# Patient Record
Sex: FEMALE | Race: WHITE | HISPANIC OR LATINO | Employment: UNEMPLOYED | URBAN - METROPOLITAN AREA
[De-identification: names, ages, dates, MRNs, and addresses within clinical notes are randomized per-mention and may not be internally consistent; named-entity substitution may affect disease eponyms.]

---

## 2017-12-14 ENCOUNTER — TELEPHONE (OUTPATIENT)
Dept: OBSTETRICS AND GYNECOLOGY | Facility: CLINIC | Age: 22
End: 2017-12-14

## 2017-12-14 DIAGNOSIS — N91.2 AMENORRHEA: Primary | ICD-10-CM

## 2018-01-02 ENCOUNTER — LAB VISIT (OUTPATIENT)
Dept: LAB | Facility: OTHER | Age: 23
End: 2018-01-02
Payer: MEDICAID

## 2018-01-02 ENCOUNTER — PROCEDURE VISIT (OUTPATIENT)
Dept: OBSTETRICS AND GYNECOLOGY | Facility: CLINIC | Age: 23
End: 2018-01-02
Payer: MEDICAID

## 2018-01-02 ENCOUNTER — INITIAL PRENATAL (OUTPATIENT)
Dept: OBSTETRICS AND GYNECOLOGY | Facility: CLINIC | Age: 23
End: 2018-01-02
Payer: MEDICAID

## 2018-01-02 ENCOUNTER — INITIAL CONSULT (OUTPATIENT)
Dept: MATERNAL FETAL MEDICINE | Facility: CLINIC | Age: 23
End: 2018-01-02
Payer: MEDICAID

## 2018-01-02 ENCOUNTER — TELEPHONE (OUTPATIENT)
Dept: OBSTETRICS AND GYNECOLOGY | Facility: CLINIC | Age: 23
End: 2018-01-02

## 2018-01-02 VITALS — DIASTOLIC BLOOD PRESSURE: 60 MMHG | BODY MASS INDEX: 18.1 KG/M2 | WEIGHT: 99 LBS | SYSTOLIC BLOOD PRESSURE: 100 MMHG

## 2018-01-02 VITALS — SYSTOLIC BLOOD PRESSURE: 98 MMHG | WEIGHT: 99.19 LBS | DIASTOLIC BLOOD PRESSURE: 62 MMHG | BODY MASS INDEX: 18.15 KG/M2

## 2018-01-02 DIAGNOSIS — Z34.00 SUPERVISION OF NORMAL FIRST PREGNANCY, ANTEPARTUM: Primary | ICD-10-CM

## 2018-01-02 DIAGNOSIS — O35.9XX0 SUSPECTED FETAL ABNORMALITY AFFECTING MANAGEMENT OF MOTHER, SINGLE OR UNSPECIFIED FETUS: ICD-10-CM

## 2018-01-02 DIAGNOSIS — N91.2 AMENORRHEA: ICD-10-CM

## 2018-01-02 DIAGNOSIS — Z36.89 ENCOUNTER FOR ULTRASOUND TO CHECK FETAL GROWTH: Primary | ICD-10-CM

## 2018-01-02 DIAGNOSIS — Z34.00 SUPERVISION OF NORMAL FIRST PREGNANCY, ANTEPARTUM: ICD-10-CM

## 2018-01-02 DIAGNOSIS — Z36.89 ESTABLISH GESTATIONAL AGE, ULTRASOUND: ICD-10-CM

## 2018-01-02 DIAGNOSIS — O35.9XX0 SUSPECTED FETAL ABNORMALITY AFFECTING MANAGEMENT OF MOTHER, ANTEPARTUM, SINGLE OR UNSPECIFIED FETUS: ICD-10-CM

## 2018-01-02 LAB
ABO + RH BLD: NORMAL
BASOPHILS # BLD AUTO: 0.01 K/UL
BASOPHILS NFR BLD: 0.1 %
BLD GP AB SCN CELLS X3 SERPL QL: NORMAL
C TRACH DNA SPEC QL NAA+PROBE: NOT DETECTED
DIFFERENTIAL METHOD: ABNORMAL
EOSINOPHIL # BLD AUTO: 0.1 K/UL
EOSINOPHIL NFR BLD: 0.5 %
ERYTHROCYTE [DISTWIDTH] IN BLOOD BY AUTOMATED COUNT: 12.6 %
HCG INTACT+B SERPL-ACNC: NORMAL MIU/ML
HCT VFR BLD AUTO: 38.5 %
HGB BLD-MCNC: 12.9 G/DL
LYMPHOCYTES # BLD AUTO: 2.5 K/UL
LYMPHOCYTES NFR BLD: 16.7 %
MCH RBC QN AUTO: 29.9 PG
MCHC RBC AUTO-ENTMCNC: 33.5 G/DL
MCV RBC AUTO: 89 FL
MONOCYTES # BLD AUTO: 0.7 K/UL
MONOCYTES NFR BLD: 4.6 %
N GONORRHOEA DNA SPEC QL NAA+PROBE: NOT DETECTED
NEUTROPHILS # BLD AUTO: 11.8 K/UL
NEUTROPHILS NFR BLD: 77.8 %
PLATELET # BLD AUTO: 362 K/UL
PMV BLD AUTO: 10 FL
RBC # BLD AUTO: 4.31 M/UL
RH BLD: NORMAL
WBC # BLD AUTO: 15.18 K/UL

## 2018-01-02 PROCEDURE — 36415 COLL VENOUS BLD VENIPUNCTURE: CPT

## 2018-01-02 PROCEDURE — 81220 CFTR GENE COM VARIANTS: CPT

## 2018-01-02 PROCEDURE — 99212 OFFICE O/P EST SF 10 MIN: CPT | Mod: PBBFAC,25,27 | Performed by: NURSE PRACTITIONER

## 2018-01-02 PROCEDURE — 99999 PR PBB SHADOW E&M-EST. PATIENT-LVL II: CPT | Mod: PBBFAC,,, | Performed by: NURSE PRACTITIONER

## 2018-01-02 PROCEDURE — 76817 TRANSVAGINAL US OBSTETRIC: CPT | Mod: 26,S$PBB,, | Performed by: OBSTETRICS & GYNECOLOGY

## 2018-01-02 PROCEDURE — 99212 OFFICE O/P EST SF 10 MIN: CPT | Mod: PBBFAC

## 2018-01-02 PROCEDURE — 86703 HIV-1/HIV-2 1 RESULT ANTBDY: CPT

## 2018-01-02 PROCEDURE — 87491 CHLMYD TRACH DNA AMP PROBE: CPT

## 2018-01-02 PROCEDURE — 86592 SYPHILIS TEST NON-TREP QUAL: CPT

## 2018-01-02 PROCEDURE — 88175 CYTOPATH C/V AUTO FLUID REDO: CPT | Performed by: PATHOLOGY

## 2018-01-02 PROCEDURE — 87340 HEPATITIS B SURFACE AG IA: CPT

## 2018-01-02 PROCEDURE — 86762 RUBELLA ANTIBODY: CPT

## 2018-01-02 PROCEDURE — 88141 CYTOPATH C/V INTERPRET: CPT | Mod: ,,, | Performed by: PATHOLOGY

## 2018-01-02 PROCEDURE — 86901 BLOOD TYPING SEROLOGIC RH(D): CPT

## 2018-01-02 PROCEDURE — 99203 OFFICE O/P NEW LOW 30 MIN: CPT | Mod: S$PBB,TH,25, | Performed by: OBSTETRICS & GYNECOLOGY

## 2018-01-02 PROCEDURE — 99999 PR PBB SHADOW E&M-EST. PATIENT-LVL II: CPT | Mod: PBBFAC,,,

## 2018-01-02 PROCEDURE — 84702 CHORIONIC GONADOTROPIN TEST: CPT

## 2018-01-02 PROCEDURE — 76817 TRANSVAGINAL US OBSTETRIC: CPT | Mod: PBBFAC | Performed by: OBSTETRICS & GYNECOLOGY

## 2018-01-02 PROCEDURE — 87086 URINE CULTURE/COLONY COUNT: CPT

## 2018-01-02 PROCEDURE — 85025 COMPLETE CBC W/AUTO DIFF WBC: CPT

## 2018-01-02 PROCEDURE — 86901 BLOOD TYPING SEROLOGIC RH(D): CPT | Mod: 91

## 2018-01-02 PROCEDURE — 99204 OFFICE O/P NEW MOD 45 MIN: CPT | Mod: TH,S$PBB,, | Performed by: NURSE PRACTITIONER

## 2018-01-02 RX ORDER — DOXYLAMINE SUCCINATE AND PYRIDOXINE HYDROCHLORIDE, DELAYED RELEASE TABLETS 10 MG/10 MG 10; 10 MG/1; MG/1
2 TABLET, DELAYED RELEASE ORAL NIGHTLY
Qty: 100 TABLET | Refills: 0 | Status: SHIPPED | OUTPATIENT
Start: 2018-01-02 | End: 2018-04-27

## 2018-01-02 NOTE — TELEPHONE ENCOUNTER
----- Message from Akin Lucero sent at 1/2/2018  4:20 PM CST -----  Contact: Pt  X_ 1st Request  _ 2nd Request  _ 3rd Request    Who: DAVE CONTRERAS [[id number]]    Why: Patient would like to know if she needs a follow up appointment.. If so would like to have the 23 in the afternoon    What Number to Call Back: [phone]    When to Expect a call back: (Before the end of the day)  -- if call after 3:00 call back will be tomorrow.

## 2018-01-02 NOTE — PROCEDURES
Procedures   Obstetrical ultrasound completed today.  See report in imaging section of [facility].

## 2018-01-02 NOTE — PROGRESS NOTES
Consultation for abnormal finding on early OB ultrasound  Physician requesting consultation: Dr. Monae  Time spent on consultation: 30 minutes (all of which was spent in face-to-face counseling and coordination of care)  Present for consultation: the patient and a friend accompanying her    We reviewed the finding a small early cystic hygroma and mild dorsal edema seen on today's ultrasound. The patient's OB, medical and family histories were reviewed and are unremarkable. She also denies any known history of congenital birth defects or genetic/chromosomal abnormalities in her fiance's family history.  The non-specific nature of today's ultrasound findings were reviewed. I stressed that although the findings may indicate a problem with the fetus, a normal outcome is also quite possible. The possible abnormalities associated with an early cystic hygroma/dorsal edema are fetal chromosomal/genetic disorders, fetal structural anomalies, or possible progression to hydrops and fetal demise. Testing options, both screening and diagnostic, for fetal chromosomal abnormalities were reviewed in detail. Pregnancy-loss rates of 1:500 for CVS and 1:1000 for amniocentesis were quoted. Plans for f/u ultrasound assessment to evaluate for progression to hydrops and to evaluate for fetal structural abnormalities were also reviewed. Fetal echo with Pediatric Cardiology at 22 - 24 weeks was also recommended.  After our discussion, the patient decided to proceed with cell-free DNA testing today and f/u ultrasound exam in 2 - 3 weeks. Further testing will be reviewed again at the patient's next appointment.

## 2018-01-02 NOTE — PROGRESS NOTES
Maryann Jones is a 22 y.o. female, ,  Presents today for a routine exam complaining of amenorrhea and positive home urine pregnancy test.  Patient's last menstrual period was 10/26/2017.   She is not currently on any contraception.  Reports nausea and vomiting daily. Reports breast tenderness. Denies vaginal bleeding and pelvic pain.  Prior  X 1- full term/ uncomplicated pregnancy.      ROS:  GENERAL: No weight changes. No swelling. No fatigue. No fever.  CARDIOVASCULAR: No chest pain. No shortness of breath. No leg cramps.   NEUROLOGICAL: No headaches. No vision changes.  BREASTS: No pain. No lumps. No discharge.  ABDOMEN: No pain. No diarrhea. No constipation.  REPRODUCTIVE: No abnormal bleeding.   VULVA: No pain. No lesions. No itching.  VAGINA: No relaxation. No itching. No odor. No discharge. No lesions.  URINARY: No incontinence. No nocturia. No frequency. No dysuria.    MEDICATIONS AND ALLERGIES:  Reviewed        COMPREHENSIVE GYN HISTORY:  PAP History: Denies abnormal Paps.  Infection History: Denies STDs. Denies PID.  Benign History: Denies uterine fibroids. Denies ovarian cysts. Denies endometriosis. Denies other conditions.  Cancer History: Denies cervical cancer. Denies uterine cancer or hyperplasia. Denies ovarian cancer. Denies vulvar cancer or pre-cancer. Denies vaginal cancer or pre-cancer. Denies breast cancer. Denies colon cancer.  Sexual Activity History: Reports currently being sexually active  Menstrual History: None.  Contraception: None    /60   Wt 44.9 kg (98 lb 15.8 oz)   LMP 10/26/2017   BMI 18.10 kg/m²     PE:  AFFECT: Calm, alert and oriented X 3. Interactive during exam  GENERAL: Appears well-nourished, well-developed, in no acute distress.  HEAD: Normocephalic, atruamatic  TEETH: Good dentition.  THYROID: No thyromegally   BREASTS: No masses, skin changes, nipple discharge or adenopathy bilaterally.  SKIN: Normal for race, warm, & dry. No lesions or rashes.  LUNGS:  Easy and unlabored, clear to auscultation bilaterally.  HEART: Regular rate and rhythm   ABDOMEN: Soft and nontender without masses or organomegally.  VULVA: No lesions, masses or tenderness.  VAGINA: Moist and well rugated without lesions or discharge.  CERVIX: Moist and pink without lesions, discharge or tenderness.      UTERUS SIZE: 8 week size, nontender and without masses.  ADNEXA: No masses or tenderness.  ESTIMATE OF PELVIC CAPACITY: Adequate  EXTREMITIES: No cyanosis, clubbing or edema. No calf tenderness.  LYMPH NODES: No axillary or inguinal adenopathy.    PROCEDURES:  UPT Positive  Genprobe  Pap      ASSESSMENT/PLAN:  Amenorrhea  Positive urine pregnancy test (JANA: 18, EGA: 9w5d based on LMP)    -  Routine prenatal care    Nausea and vomiting in pregnancy    -  Education regarding lifestyle and dietary modifications    -  Advised use of B6/Unisom. Pt will notify us if no relief/worsening symptoms, will consider Zofran if needed.      1st TRIMESTER COUNSELING: Discussed all, booklet provided:  Common complaints of pregnancy  HIV and other routine prenatal tests including  genetic screening  Risk factors identified by prenatal history  Oriented to practice - discussed anticipated course of prenatal care & indications for Ultrasound  Childbirth classes/Hospital facilities   Nutrition and weight gain counseling  Toxoplasmosis precautions (Cats/Raw Meat)  Sexual activity and exercise  Environmental/Work hazards  Travel  Tobacco (Ask, Advise, Assess, Assist, and Arrange), as well as alcohol and drug use  Use of any medications (Including supplements, Vitamins, Herbs, or OTC Drugs)  Domestic violence  Seat belt use      TERATOLOGY COUNSELING:   Discussed indications and options for aneuploidy screening - pamphlets given  Pt to see Dr. Rice to discuss genetic screening options  Dating US done today  FOLLOW-UP in 4 weeks with Dr. Letha Cabezas NP    OB/GYN

## 2018-01-02 NOTE — NURSING
Pt s/p consult with Dr. Cho and will have [id number] drawn today with OB labs. Pt informed that if % of fetal fraction to low, test may need to be repeated. Pt verbalized understanding of information.

## 2018-01-03 PROBLEM — Z67.91 RH NEGATIVE STATE IN ANTEPARTUM PERIOD: Status: ACTIVE | Noted: 2018-01-03

## 2018-01-03 PROBLEM — O26.899 RH NEGATIVE STATE IN ANTEPARTUM PERIOD: Status: ACTIVE | Noted: 2018-01-03

## 2018-01-03 LAB
BACTERIA UR CULT: NORMAL
HBV SURFACE AG SERPL QL IA: NEGATIVE
HIV 1+2 AB+HIV1 P24 AG SERPL QL IA: NEGATIVE
RPR SER QL: NORMAL
RUBV IGG SER-ACNC: 69.1 IU/ML
RUBV IGG SER-IMP: REACTIVE

## 2018-01-05 LAB — CFTR MUT ANL BLD/T: NORMAL

## 2018-01-08 PROBLEM — R87.612 LGSIL ON PAP SMEAR OF CERVIX: Status: ACTIVE | Noted: 2018-01-08

## 2018-01-10 ENCOUNTER — TELEPHONE (OUTPATIENT)
Dept: MATERNAL FETAL MEDICINE | Facility: CLINIC | Age: 23
End: 2018-01-10

## 2018-01-10 NOTE — TELEPHONE ENCOUNTER
"Patient returning call to [facility] clinic. Patient was notified that [id number] result is "Non-reportable" due to <3% fetal fraction. Patient states that she was "aware that this could happen" since it was drawn at early gestational age. Offered for patient to come in for repeat lab draw and patient states that she will have drawn when she returns for her [facility] ultrasound on 1/23/18.    Pt verbalized understanding of information.    "

## 2018-01-23 ENCOUNTER — APPOINTMENT (OUTPATIENT)
Dept: LAB | Facility: OTHER | Age: 23
End: 2018-01-23
Payer: MEDICAID

## 2018-01-23 ENCOUNTER — OFFICE VISIT (OUTPATIENT)
Dept: MATERNAL FETAL MEDICINE | Facility: CLINIC | Age: 23
End: 2018-01-23
Payer: MEDICAID

## 2018-01-23 VITALS — DIASTOLIC BLOOD PRESSURE: 62 MMHG | BODY MASS INDEX: 18.27 KG/M2 | WEIGHT: 99.88 LBS | SYSTOLIC BLOOD PRESSURE: 100 MMHG

## 2018-01-23 DIAGNOSIS — O35.9XX0 FETAL ABNORMALITY AFFECTING MANAGEMENT OF MOTHER, SINGLE OR UNSPECIFIED FETUS: ICD-10-CM

## 2018-01-23 DIAGNOSIS — Z36.89 ENCOUNTER FOR ULTRASOUND TO CHECK FETAL GROWTH: ICD-10-CM

## 2018-01-23 DIAGNOSIS — Z36.89 ENCOUNTER FOR FETAL ANATOMIC SURVEY: Primary | ICD-10-CM

## 2018-01-23 PROCEDURE — 99999 PR PBB SHADOW E&M-EST. PATIENT-LVL II: CPT | Mod: PBBFAC,,,

## 2018-01-23 PROCEDURE — 76801 OB US < 14 WKS SINGLE FETUS: CPT | Mod: 26,S$PBB,, | Performed by: OBSTETRICS & GYNECOLOGY

## 2018-01-23 PROCEDURE — 99212 OFFICE O/P EST SF 10 MIN: CPT | Mod: S$PBB,TH,25, | Performed by: OBSTETRICS & GYNECOLOGY

## 2018-01-23 PROCEDURE — 76801 OB US < 14 WKS SINGLE FETUS: CPT | Mod: PBBFAC | Performed by: OBSTETRICS & GYNECOLOGY

## 2018-01-23 PROCEDURE — 99212 OFFICE O/P EST SF 10 MIN: CPT | Mod: PBBFAC,TH

## 2018-01-23 RX ORDER — MULTIVITAMIN
1 TABLET ORAL DAILY
Status: ON HOLD | COMMUNITY
End: 2018-07-21 | Stop reason: HOSPADM

## 2018-01-23 NOTE — PROGRESS NOTES
OB Ultrasound Report (see PDF version under imaging tab) and f/u consultation    Indication  ========    F/U Consultation: fetal dorsal edema seen on last exam.    Maternal Assessment  ================    Weight 45 kg  Weight (lb) 99 lb    Method  ======    Transabdominal ultrasound examination.    Pregnancy  =========    [facility] pregnancy. Number of fetuses: 1.    Dating  ======    Cycle: regular cycle  Ultrasound examination on: 1/23/2018  GA by U/S based upon: CRL  GA by U/S 12 w + 5 d  JANA by U/S: 8/2/2018  Assigned: Dating performed on 01/2/2018, based on the LMP  Assigned GA 12 w + 5 d  Assigned JANA: 8/2/2018    General Evaluation  ===============    Cardiac activity: present.  Placenta: anterior.  Cord vessels: 3 vessel cord.  Amniotic fluid: normal amount.    Fetal Biometry  ===========    CRL 63.6 mm 12w 5d Hadlock   bpm    Fetal Anatomy  ===========    The following structures appear normal:  Cranium. Thorax. Abdominal wall.    The following structures were visualized:  GI tract. Urogenital tract. Arms. Legs.    Maternal Structures  ===============    Uterus / Cervix  Uterus: Normal  Ovaries / Tubes / Adnexa  Rt ovary: Visualized  Rt ovary D1 7.1 cm  Rt ovary D2 8.2 cm  Rt ovary D3 6.4 cm  Rt ovary mean 7.2 cm  Rt ovary vol 196.2 cm³  Rt ovarian cyst(s): No cysts identified  Origin of mass: right adnexal region  Findings: unilocular cyst  D1 71.0 mm  D2 59.0 mm  D3 61.0 mm  Vol 133.795 cm³  Lt ovary: Visualized  Lt ovarian corpus luteum: visualized  Lt ovary D1 2.8 cm  Lt ovary D2 2.0 cm  Lt ovary D3 1.8 cm  Lt ovary mean 2.2 cm  Lt ovary vol 5.2 cm³  Lt ovarian corpus luteum D1 20.0 mm  Lt ovarian corpus luteum D2 11.0 mm  Lt ovarian corpus luteum D3 14.0 mm  Pouch of Jaron / Other Structures  Cul de Sac: Visualized  Free fluid: No free fluid visualized    Ultrasound Impression and f/u consult  =========    Interval fetal growth has been normal. The previously seen dorsal edema has resolved,  and the nuchal translucency area is normal in  dimension. Normal fetal cardiac activity is observed.  Findings and plans for f/u discussed with the patient and her family today (10 minutes spent in discussion and coordination of f/u  care).  The simple cystic right adnexal mass seen on the last exam (probable paraovarian cyst) is stable in size.    Recommendation  ==============    1. Repeat blood draw today for cell-free DNA testing (fetal fraction insufficient on first sample)  2. Fetal anatomic survey ultrasound exam at 19 - 20 weeks  3. Fetal echo at 22 - 24 weeks.

## 2018-01-25 ENCOUNTER — TELEPHONE (OUTPATIENT)
Dept: PEDIATRIC CARDIOLOGY | Facility: CLINIC | Age: 23
End: 2018-01-25

## 2018-01-25 NOTE — TELEPHONE ENCOUNTER
----- Message from Christina Crowell RN sent at 1/23/2018  2:00 PM CST -----  Pramod Montanez-     Could you schedule this patient for a fetal echo (for cystic hygroma)? She is currently 12 weeks and 5 days and Dr. Cho would like it between 22 and 24 weeks (March 29 - April 12). She is having her [id number] redrawn today. She is a patient of Dr. Monae. She knows to expect your call.    Thank you!!  Christina

## 2018-02-01 ENCOUNTER — ROUTINE PRENATAL (OUTPATIENT)
Dept: OBSTETRICS AND GYNECOLOGY | Facility: CLINIC | Age: 23
End: 2018-02-01
Payer: MEDICAID

## 2018-02-01 VITALS — WEIGHT: 101 LBS | BODY MASS INDEX: 18.47 KG/M2 | DIASTOLIC BLOOD PRESSURE: 70 MMHG | SYSTOLIC BLOOD PRESSURE: 112 MMHG

## 2018-02-01 DIAGNOSIS — Z34.00 SUPERVISION OF NORMAL FIRST PREGNANCY, ANTEPARTUM: Primary | ICD-10-CM

## 2018-02-01 DIAGNOSIS — Z36.9 ANTENATAL SCREENING ENCOUNTER: ICD-10-CM

## 2018-02-01 PROCEDURE — 99213 OFFICE O/P EST LOW 20 MIN: CPT | Mod: TH,S$PBB,, | Performed by: OBSTETRICS & GYNECOLOGY

## 2018-02-01 PROCEDURE — 99999 PR PBB SHADOW E&M-EST. PATIENT-LVL II: CPT | Mod: PBBFAC,,, | Performed by: OBSTETRICS & GYNECOLOGY

## 2018-02-01 PROCEDURE — 99212 OFFICE O/P EST SF 10 MIN: CPT | Mod: PBBFAC,TH | Performed by: OBSTETRICS & GYNECOLOGY

## 2018-02-01 NOTE — PROGRESS NOTES
Doing well. No complaints. Followed by M for cystic hygroma that has now resolved. T21 still pending. Pt followed by M. RTC 4 weeks with Hailey.

## 2018-02-02 ENCOUNTER — TELEPHONE (OUTPATIENT)
Dept: MATERNAL FETAL MEDICINE | Facility: CLINIC | Age: 23
End: 2018-02-02

## 2018-02-02 NOTE — TELEPHONE ENCOUNTER
Negative [id number] results phoned to patient. Patient notified and aware that her lab specimen showed an expected representation of chromosome 21, 18 and 13 material and that it was consistent with a male fetus.     Patient verbalized understanding of reported results.

## 2018-03-02 ENCOUNTER — ROUTINE PRENATAL (OUTPATIENT)
Dept: OBSTETRICS AND GYNECOLOGY | Facility: CLINIC | Age: 23
End: 2018-03-02
Payer: MEDICAID

## 2018-03-02 VITALS
BODY MASS INDEX: 20.32 KG/M2 | SYSTOLIC BLOOD PRESSURE: 110 MMHG | WEIGHT: 111.13 LBS | DIASTOLIC BLOOD PRESSURE: 72 MMHG

## 2018-03-02 DIAGNOSIS — Z34.80 SUPERVISION OF OTHER NORMAL PREGNANCY, ANTEPARTUM: Primary | ICD-10-CM

## 2018-03-02 PROCEDURE — 99212 OFFICE O/P EST SF 10 MIN: CPT | Mod: TH,S$PBB,, | Performed by: NURSE PRACTITIONER

## 2018-03-02 PROCEDURE — 99213 OFFICE O/P EST LOW 20 MIN: CPT | Mod: PBBFAC,TH | Performed by: NURSE PRACTITIONER

## 2018-03-02 PROCEDURE — 99999 PR PBB SHADOW E&M-EST. PATIENT-LVL III: CPT | Mod: PBBFAC,,, | Performed by: NURSE PRACTITIONER

## 2018-03-02 NOTE — PROGRESS NOTES
Here for routine OB appt at 18w1d, with no complaints.  Pt with 10 lb weight gain since last appt.  Discussed portion control, making good choices ie. Protein snacks and exercise. Reports occasional quickening. Denies VB and cramping.  Pain, bleeding, and PTL precautions given.  MFM anatomy scan scheduled for 3/15/18.  Peds echo and cards scheduled for 4/2/18- cystic hygroma (resolved).  F/U scheduled 4 weeks

## 2018-03-15 ENCOUNTER — OFFICE VISIT (OUTPATIENT)
Dept: MATERNAL FETAL MEDICINE | Facility: CLINIC | Age: 23
End: 2018-03-15
Payer: MEDICAID

## 2018-03-15 VITALS — BODY MASS INDEX: 20.73 KG/M2 | WEIGHT: 113.31 LBS | DIASTOLIC BLOOD PRESSURE: 64 MMHG | SYSTOLIC BLOOD PRESSURE: 98 MMHG

## 2018-03-15 DIAGNOSIS — Z36.89 ENCOUNTER FOR ULTRASOUND TO CHECK FETAL GROWTH: Primary | ICD-10-CM

## 2018-03-15 DIAGNOSIS — O35.9XX0 FETAL ABNORMALITY AFFECTING MANAGEMENT OF MOTHER, SINGLE OR UNSPECIFIED FETUS: ICD-10-CM

## 2018-03-15 DIAGNOSIS — Z36.89 ENCOUNTER FOR FETAL ANATOMIC SURVEY: ICD-10-CM

## 2018-03-15 PROCEDURE — 76811 OB US DETAILED SNGL FETUS: CPT | Mod: PBBFAC | Performed by: OBSTETRICS & GYNECOLOGY

## 2018-03-15 PROCEDURE — 99212 OFFICE O/P EST SF 10 MIN: CPT | Mod: PBBFAC,TH,25

## 2018-03-15 PROCEDURE — 76811 OB US DETAILED SNGL FETUS: CPT | Mod: 26,S$PBB,, | Performed by: OBSTETRICS & GYNECOLOGY

## 2018-03-15 PROCEDURE — 99212 OFFICE O/P EST SF 10 MIN: CPT | Mod: S$PBB,TH,25, | Performed by: OBSTETRICS & GYNECOLOGY

## 2018-03-15 PROCEDURE — 99999 PR PBB SHADOW E&M-EST. PATIENT-LVL II: CPT | Mod: PBBFAC,,,

## 2018-03-15 NOTE — PROGRESS NOTES
OB Ultrasound Report (see PDF version under imaging tab) and return office visit    Indication  ========    Targeted Fetal anatomy survey - fetal dorsal edema and small cystic hygroma on first trimester exam.    History  ======    General History  Other: ZULEMA BRYAN,  Previous Outcomes  Preg. no. 1  Outcome: Live YOB: 2017  Gest. age 40 w + 0 d  Gender: male  Details:    2  Para 1  [facility] children born living (T) 1  [facility] children born (T) 1  Zamora living children (L) 1    Pregnancy History  ===============    Maternal Lab Tests  Test: Cell Free DNA Testing  Result: Materni [id number]  Wants to know gender: yes    Maternal Assessment  ================    Weight 51 kg  Weight (lb) 112 lb  BP syst 98 mmHg  BP diast 64 mmHg    Method  ======    Transabdominal ultrasound examination.    Pregnancy  =========    [facility] pregnancy. Number of fetuses: 1.    Dating  ======    Cycle: regular cycle  Ultrasound examination on: 3/15/2018  GA by U/S based upon: AC, BPD, Femur, HC  GA by U/S 20 w + 2 d  JANA by U/S: 2018  Assigned: Dating performed on 2018, based on the LMP  Assigned GA 20 w + 0 d  Assigned JANA: 2018    General Evaluation  ===============    Cardiac activity: present.  bpm.  Fetal movements: visualized.  Presentation: cephalic.  Placenta:  Placental site: anterior.  Umbilical cord: Cord vessels: 3 vessel cord. Cord insertion: placental insertion: normal.  Amniotic fluid: Amount of AF: normal amount.    Fetal Biometry  ===========    Fetal Biometry  BPD 48.3 mm 20w 4d Hadlock  OFD 60.4 mm 20w 6d Pedro  .1 mm 20w 0d Hadlock  .1 mm 20w 3d Hadlock  Femur 32.9 mm 20w 2d Hadlock  Cerebellum tr 19.4 mm 19w 3d Rojas  CM 3.7 mm  Nuchal fold 4.71 mm  Humerus 32.0 mm 20w 5d Pedro   g 27% Fer  Calculated by: Hadlock (BPD-HC-AC-FL)  EFW (lb) 0 lb  EFW (oz) 12 oz  Cephalic index 0.80  HC / AC 1.15  FL / BPD 0.68  FL / AC 0.22    bpm  Head / Face / Neck   5.2 mm  Nasal bone 6.2 mm    Fetal Anatomy  ===========    Cranium: normal  Lateral ventricles: normal  Choroid plexus: normal  Midline falx: normal  Cavum septi pellucidi: normal  Cerebellum: normal  Cisterna magna: normal  Lips: normal  Profile: normal  Nose: normal  RVOT: normal  LVOT: normal  4-chamber view: 4-chamber normal, septum normal  Situs: normal  Aortic arch: suboptimal  Ductal arch: suboptimal  SVC: normal  IVC: normal  3-vessel view: suboptimal  3-vessel-trachea view: suboptimal  Diaphragm: visualized  Cord insertion: normal  Stomach: normal  Kidneys: normal  Bladder: normal  Genitals: normal  Rt renal artery: visualized  Lt renal artery: visualized  Cervical spine: normal  Thoracic spine: normal  Lumbar spine: normal  Sacral spine: normal  Arms: both visualized  Legs: both visualized  Rt upper arm: normal  Rt forearm: normal  Rt hand: visualized  Lt upper arm: normal  Lt forearm: normal  Lt hand: visualized  Rt upper leg: normal  Rt lower leg: normal  Rt foot: visualized  Lt upper leg: normal  Lt lower leg: normal  Lt foot: visualized  Position of feet: normal  Gender: male  Wants to know gender: yes    Maternal Structures  ===============    Uterus / Cervix  Cervical length 32.0 mm  Ovaries / Tubes / Adnexa  Origin of mass: paraovarian  Findings: simple, unilocular cyst  D1 77.0 mm  D2 51.0 mm  D3 59.0 mm  Vol 121.314 cm³  Doppler:  no detectable blood flow    Lt ovary: Visualized    Ultrasound Impression and office visit    A detailed fetal anatomic ultrasound examination was performed for the following high risk indication: fetal dorsal edema and small  cystic hygroma on first trimester exam. No fetal structural malformations are identified; however, fetal cardiac imaging is incomplete  today. The patient is already scheduled for a fetal echo with Pediatric Cardiology due to the increased risk for cardiac anomalies  associated with cystic hygromas and fetal  edema.  Fetal size today is consistent with established gestational age. Cervical length is normal. Placental location is normal without evidence  of previa. The right paraovarian cyst seen on prior exams is stable.  The findings from today's exam and plans for f/u assessment, including fetal echo with Pediatric Cardiology and f/u growth studies  beginning at 28 weeks were discussed with the patient today (10 minutes spent in face-to-face discussion).

## 2018-04-17 ENCOUNTER — TELEPHONE (OUTPATIENT)
Dept: OBSTETRICS AND GYNECOLOGY | Facility: CLINIC | Age: 23
End: 2018-04-17

## 2018-04-17 DIAGNOSIS — Z34.00 SUPERVISION OF NORMAL FIRST PREGNANCY, ANTEPARTUM: Primary | ICD-10-CM

## 2018-04-17 NOTE — TELEPHONE ENCOUNTER
Returning patients call. Patient notified of scheduled lab appointment on Thursday at 1:30. Verbalized understanding.               ----- Message from Irma French sent at 4/17/2018  3:12 PM CDT -----  Contact: Breanne  Name of Who is Calling:Breanne      What is the request in detail: Patient was returning a missed call back from the staff       Can the clinic reply by [facility]: No      What Number to Call Back if not in [facility]:[phone]

## 2018-04-17 NOTE — TELEPHONE ENCOUNTER
Called to inform patient of lab appointment that has been scheduled for Thursday, at 1:30, prior to her appointment with Hailey. No answer ,left message to call office.          Please call pt and let her know we will have her do her OB glucose screen with next appt.  She will need to go to the lab prior to the appt and drink the glucose drink, then come to appt and go back to lab 1 hr later for blood work.  Not a fasting lab- ok to eat but avoid food high in sugar.  Orders are in please schedule

## 2018-04-26 ENCOUNTER — TELEPHONE (OUTPATIENT)
Dept: OBSTETRICS AND GYNECOLOGY | Facility: CLINIC | Age: 23
End: 2018-04-26

## 2018-04-26 NOTE — TELEPHONE ENCOUNTER
Patient notified, stated that she will go to lab for 9:30.           Pt is scheduled with me for appt tomorrow.  She needs OB glucose screen.  Please have her for to the lab prior to her appt to drink glucose drink.  Not a fasting lab- ok to eat avoid foods high in sugar.  Order was previously placed

## 2018-04-27 ENCOUNTER — LAB VISIT (OUTPATIENT)
Dept: LAB | Facility: OTHER | Age: 23
End: 2018-04-27
Attending: NURSE PRACTITIONER
Payer: MEDICAID

## 2018-04-27 ENCOUNTER — ROUTINE PRENATAL (OUTPATIENT)
Dept: OBSTETRICS AND GYNECOLOGY | Facility: CLINIC | Age: 23
End: 2018-04-27
Payer: MEDICAID

## 2018-04-27 VITALS — WEIGHT: 125 LBS | SYSTOLIC BLOOD PRESSURE: 102 MMHG | BODY MASS INDEX: 22.86 KG/M2 | DIASTOLIC BLOOD PRESSURE: 58 MMHG

## 2018-04-27 DIAGNOSIS — Z23 NEED FOR TDAP VACCINATION: ICD-10-CM

## 2018-04-27 DIAGNOSIS — Z34.00 SUPERVISION OF NORMAL FIRST PREGNANCY, ANTEPARTUM: Primary | ICD-10-CM

## 2018-04-27 DIAGNOSIS — Z67.91 RH NEGATIVE STATE IN ANTEPARTUM PERIOD: ICD-10-CM

## 2018-04-27 DIAGNOSIS — Z34.00 SUPERVISION OF NORMAL FIRST PREGNANCY, ANTEPARTUM: ICD-10-CM

## 2018-04-27 DIAGNOSIS — O26.899 RH NEGATIVE STATE IN ANTEPARTUM PERIOD: ICD-10-CM

## 2018-04-27 LAB
BASOPHILS # BLD AUTO: ABNORMAL K/UL
BASOPHILS NFR BLD: 0 %
DIFFERENTIAL METHOD: ABNORMAL
EOSINOPHIL # BLD AUTO: ABNORMAL K/UL
EOSINOPHIL NFR BLD: 1 %
ERYTHROCYTE [DISTWIDTH] IN BLOOD BY AUTOMATED COUNT: 12.9 %
GLUCOSE SERPL-MCNC: 108 MG/DL
HCT VFR BLD AUTO: 33.3 %
HGB BLD-MCNC: 10.4 G/DL
LYMPHOCYTES # BLD AUTO: ABNORMAL K/UL
LYMPHOCYTES NFR BLD: 12 %
MCH RBC QN AUTO: 28.4 PG
MCHC RBC AUTO-ENTMCNC: 31.2 G/DL
MCV RBC AUTO: 91 FL
MONOCYTES # BLD AUTO: ABNORMAL K/UL
MONOCYTES NFR BLD: 7 %
MYELOCYTES NFR BLD MANUAL: 1 %
NEUTROPHILS NFR BLD: 78 %
NEUTS BAND NFR BLD MANUAL: 1 %
PLATELET # BLD AUTO: 300 K/UL
PLATELET BLD QL SMEAR: ABNORMAL
PMV BLD AUTO: 9.8 FL
RBC # BLD AUTO: 3.66 M/UL
WBC # BLD AUTO: 13.65 K/UL

## 2018-04-27 PROCEDURE — 85007 BL SMEAR W/DIFF WBC COUNT: CPT

## 2018-04-27 PROCEDURE — 36415 COLL VENOUS BLD VENIPUNCTURE: CPT

## 2018-04-27 PROCEDURE — 82950 GLUCOSE TEST: CPT

## 2018-04-27 PROCEDURE — 85027 COMPLETE CBC AUTOMATED: CPT

## 2018-04-27 PROCEDURE — 99212 OFFICE O/P EST SF 10 MIN: CPT | Mod: TH,S$PBB,, | Performed by: NURSE PRACTITIONER

## 2018-04-27 PROCEDURE — 99213 OFFICE O/P EST LOW 20 MIN: CPT | Mod: PBBFAC,TH | Performed by: NURSE PRACTITIONER

## 2018-04-27 PROCEDURE — 99999 PR PBB SHADOW E&M-EST. PATIENT-LVL III: CPT | Mod: PBBFAC,,, | Performed by: NURSE PRACTITIONER

## 2018-04-27 NOTE — PROGRESS NOTES
Here for routine OB appt at 26w1d, with complaints of mid back pain.  Discussed Tylenol PRN, alternating ice and heat.  Reports + FM.  Denies VB and cramping.  Pain, bleeding, and PTL precautions given.  Discussed weight gain in pregnancy - she has gained 26 lbs to date.  Discussed portion control, good food choices, and exercise.  OB glucose and CBC today.    Rhogam and Tdap with next appt.   MFM scan on 5/10/18 for growth.   F/U scheduled 3 weeks

## 2018-05-04 ENCOUNTER — PATIENT MESSAGE (OUTPATIENT)
Dept: MATERNAL FETAL MEDICINE | Facility: CLINIC | Age: 23
End: 2018-05-04

## 2018-05-15 ENCOUNTER — CLINICAL SUPPORT (OUTPATIENT)
Dept: OBSTETRICS AND GYNECOLOGY | Facility: CLINIC | Age: 23
End: 2018-05-15
Payer: MEDICAID

## 2018-05-15 ENCOUNTER — ROUTINE PRENATAL (OUTPATIENT)
Dept: OBSTETRICS AND GYNECOLOGY | Facility: CLINIC | Age: 23
End: 2018-05-15
Payer: MEDICAID

## 2018-05-15 VITALS
SYSTOLIC BLOOD PRESSURE: 102 MMHG | WEIGHT: 127.44 LBS | BODY MASS INDEX: 23.31 KG/M2 | DIASTOLIC BLOOD PRESSURE: 64 MMHG

## 2018-05-15 DIAGNOSIS — O36.5930 POOR FETAL GROWTH AFFECTING MANAGEMENT OF MOTHER IN THIRD TRIMESTER, SINGLE OR UNSPECIFIED FETUS: Primary | ICD-10-CM

## 2018-05-15 DIAGNOSIS — Z67.91 RH NEGATIVE STATE IN ANTEPARTUM PERIOD: ICD-10-CM

## 2018-05-15 DIAGNOSIS — O26.899 RH NEGATIVE STATE IN ANTEPARTUM PERIOD: ICD-10-CM

## 2018-05-15 PROCEDURE — 99999 PR PBB SHADOW E&M-EST. PATIENT-LVL I: CPT | Mod: PBBFAC,,,

## 2018-05-15 PROCEDURE — 99999 PR PBB SHADOW E&M-EST. PATIENT-LVL III: CPT | Mod: PBBFAC,,, | Performed by: OBSTETRICS & GYNECOLOGY

## 2018-05-15 PROCEDURE — 96372 THER/PROPH/DIAG INJ SC/IM: CPT | Mod: PBBFAC

## 2018-05-15 PROCEDURE — 99213 OFFICE O/P EST LOW 20 MIN: CPT | Mod: PBBFAC,27,TH,25 | Performed by: OBSTETRICS & GYNECOLOGY

## 2018-05-15 PROCEDURE — 99214 OFFICE O/P EST MOD 30 MIN: CPT | Mod: TH,S$PBB,, | Performed by: OBSTETRICS & GYNECOLOGY

## 2018-05-15 PROCEDURE — 99211 OFF/OP EST MAY X REQ PHY/QHP: CPT | Mod: PBBFAC,TH

## 2018-05-15 PROCEDURE — 90471 IMMUNIZATION ADMIN: CPT | Mod: PBBFAC

## 2018-05-15 NOTE — PROGRESS NOTES
Pt doing well. No vaginal bleeding, no loss of fluid, positive fetal movement, no contractions. Bleeding/labor/rom/fmc precautions.     Rhogam, Tdap done today.     Pt missed [facility] scan as she does not want to keep getting growth scans. We discussed importance of scans. She agreed she will keep fetal echo and will do 1 more growth scan at 32 weeks. I called [facility] and scheduled this for 6/5/18. RTC 3 weeks with me.

## 2018-05-15 NOTE — PROGRESS NOTES
Here for TDAP injection. Patient without complaint of pain at this time ,Injection given. Tolerated well. No pain noted after injection.  Advised to wait in lobby 15 minutes and report any adverse reactions.         Site: [facility]    Baby Friendly Handout Given to Patient      Here for rhogam injection , no complaints at this time. Verified patient is RH negative.   No complaint of pain before or after injection. Patient advised to wait 15 minutes before leaving and report any adverse reactions.      Site: [facility]

## 2018-05-25 ENCOUNTER — CLINICAL SUPPORT (OUTPATIENT)
Dept: PEDIATRIC CARDIOLOGY | Facility: CLINIC | Age: 23
End: 2018-05-25
Attending: OBSTETRICS & GYNECOLOGY
Payer: MEDICAID

## 2018-05-25 ENCOUNTER — OFFICE VISIT (OUTPATIENT)
Dept: PEDIATRIC CARDIOLOGY | Facility: CLINIC | Age: 23
End: 2018-05-25
Attending: PEDIATRICS
Payer: MEDICAID

## 2018-05-25 VITALS
WEIGHT: 131.19 LBS | SYSTOLIC BLOOD PRESSURE: 102 MMHG | HEIGHT: 62 IN | DIASTOLIC BLOOD PRESSURE: 64 MMHG | BODY MASS INDEX: 24.14 KG/M2

## 2018-05-25 DIAGNOSIS — O35.9XX0 FETAL ABNORMALITY AFFECTING MANAGEMENT OF MOTHER, SINGLE OR UNSPECIFIED FETUS: ICD-10-CM

## 2018-05-25 PROCEDURE — 99212 OFFICE O/P EST SF 10 MIN: CPT | Mod: PBBFAC | Performed by: PEDIATRICS

## 2018-05-25 PROCEDURE — 76825 ECHO EXAM OF FETAL HEART: CPT | Mod: 26,S$PBB,, | Performed by: PEDIATRICS

## 2018-05-25 PROCEDURE — 76827 ECHO EXAM OF FETAL HEART: CPT | Mod: 26,S$PBB,, | Performed by: PEDIATRICS

## 2018-05-25 PROCEDURE — 93325 DOPPLER ECHO COLOR FLOW MAPG: CPT | Mod: 26,S$PBB,, | Performed by: PEDIATRICS

## 2018-05-25 PROCEDURE — 76825 ECHO EXAM OF FETAL HEART: CPT | Mod: PBBFAC | Performed by: PEDIATRICS

## 2018-05-25 PROCEDURE — 76827 ECHO EXAM OF FETAL HEART: CPT | Mod: PBBFAC | Performed by: PEDIATRICS

## 2018-05-25 PROCEDURE — 99999 PR PBB SHADOW E&M-EST. PATIENT-LVL II: CPT | Mod: PBBFAC,,, | Performed by: PEDIATRICS

## 2018-05-25 PROCEDURE — 93325 DOPPLER ECHO COLOR FLOW MAPG: CPT | Mod: PBBFAC | Performed by: PEDIATRICS

## 2018-05-25 PROCEDURE — 99203 OFFICE O/P NEW LOW 30 MIN: CPT | Mod: 25,S$PBB,, | Performed by: PEDIATRICS

## 2018-05-25 NOTE — LETTER
May 28, 2018        Jeni Monae MD  [address] [address]             [facility]  [address]  [facility] [address]  Phone: [phone]  Fax: [phone]   Patient: Maryann Jones   MR Number: [id number]   YOB: 1995   Date of Visit: 5/25/2018       Dear Dr. Monae:    Thank you for referring Maryann Jones to me for evaluation. Below are the relevant portions of my assessment and plan of care.            If you have questions, please do not hesitate to call me. I look forward to following Maryann along with you.    Sincerely,      Hafsa Lewis MD           CC  No Recipients

## 2018-05-26 NOTE — PROGRESS NOTES
[facility], operated by [facility] Fetal Cardiology [facility]    Today, I had the pleasure of evaluating Maryann Jones who is now 23 y.o. and carrying her second pregnancy at 30 1/4 weeks gestation with an JANA of 2018. She was referred for evaluation of the fetal heart due fetal cystic hygroma.  Anatomy scan without concern of anomalies. [id number] negative.    She is carrying a male fetus. The pregnancy has otherwise been uncomplicated.     Obstetric History:    .  Her OB history is otherwise unremarkable.     History reviewed. No pertinent past medical history.    Current Outpatient Prescriptions:     multivitamin (ONE DAILY MULTIVITAMIN) per tablet, Take 1 tablet by mouth once daily., Disp: , Rfl:     ranitidine (ZANTAC) 150 MG capsule, Take 150 mg by mouth as needed for Heartburn., Disp: , Rfl:      Review of patient's allergies indicates:  No Known Allergies    Family History: Negative for congenital heart disease, genetic syndromes, multiple miscarriages or other congenital anomalies.    Social History: Ms. Maryann Jones is in a relationship with he father of the baby/single.  The father of the baby is involved.     FETAL ECHOCARDIOGRAM (summary):  Fetal echo at 30 1/7 wga, JANA 18.  Normal fetal echocardiogram.  (Full report in electronic medical record)    Impression:  Single active male fetus at 30 1/7 wga.  Normal fetal echocardiogram.      Todays fetal echocardiogram is normal, within the limitations of fetal echocardiography.  I discussed with her that fetal echocardiography is insufficiently sensitive to rule out all septal defects, anomalies of pulmonary and systemic veins, arch anomalies, and some valvar abnormalities, nor can it ensure that the ductus arteriosus and foramen ovale will spontaneously close.     Recommendations:  Location, timing, and mode of delivery will be determined by the obstetrical team.  She does not require further follow-up in the fetal echocardiography clinic, but I  would be happy to see her again if additional questions or concerns arise.    Should there be any concerns about the baby's heart after birth, a post- echocardiogram and cardiology consultation are recommended.         Hafsa Lewis MD, MSCI  Pediatric Cardiology  Pediatric Echocardiography, Fetal Echocardiography, Cardiac MRI  [facility] [address] [place]  [address]  Phone [phone], Fax [phone]      The above information was discussed in detail including the use of diagrams, 30 minutes were used for the evaluation with half of that time in discussion and counseling.

## 2018-06-11 ENCOUNTER — OFFICE VISIT (OUTPATIENT)
Dept: MATERNAL FETAL MEDICINE | Facility: CLINIC | Age: 23
End: 2018-06-11
Payer: MEDICAID

## 2018-06-11 DIAGNOSIS — O36.5930 POOR FETAL GROWTH AFFECTING MANAGEMENT OF MOTHER IN THIRD TRIMESTER, SINGLE OR UNSPECIFIED FETUS: ICD-10-CM

## 2018-06-11 DIAGNOSIS — O40.3XX0 POLYHYDRAMNIOS IN THIRD TRIMESTER COMPLICATION, SINGLE OR UNSPECIFIED FETUS: ICD-10-CM

## 2018-06-11 PROCEDURE — 76816 OB US FOLLOW-UP PER FETUS: CPT | Mod: 26,S$PBB,, | Performed by: OBSTETRICS & GYNECOLOGY

## 2018-06-11 PROCEDURE — 76819 FETAL BIOPHYS PROFIL W/O NST: CPT | Mod: 26,S$PBB,, | Performed by: OBSTETRICS & GYNECOLOGY

## 2018-06-11 PROCEDURE — 99499 UNLISTED E&M SERVICE: CPT | Mod: S$PBB,,, | Performed by: OBSTETRICS & GYNECOLOGY

## 2018-06-11 PROCEDURE — 76816 OB US FOLLOW-UP PER FETUS: CPT | Mod: PBBFAC | Performed by: OBSTETRICS & GYNECOLOGY

## 2018-06-11 PROCEDURE — 76819 FETAL BIOPHYS PROFIL W/O NST: CPT | Mod: PBBFAC | Performed by: OBSTETRICS & GYNECOLOGY

## 2018-06-11 NOTE — LETTER
June 11, 2018      Jeni Monae MD  [address] [address]           [facility]etal [facility]  [address]  [facility] [address]  Phone: [phone]          Patient: Maryann Jones   MR Number: [id number]   YOB: 1995   Date of Visit: 6/11/2018       Dear Dr. Jeni Monae:    Thank you for referring Maryann Jones to me for evaluation. Attached you will find relevant portions of my assessment and plan of care.    If you have questions, please do not hesitate to call me. I look forward to following Maryann Jones along with you.    Sincerely,    Jeni Jay MD    Enclosure  CC:  No Recipients    If you would like to receive this communication electronically, please contact [email or web address] or [phone] to request more information on [employer] Link access.    For providers and/or their staff who would like to refer a patient to [facility], please contact us through our one-stop-shop provider referral line, [facility], at [phone].    If you feel you have received this communication in error or would no longer like to receive these types of communications, please e-mail [email or web address]

## 2018-06-15 DIAGNOSIS — O40.3XX0 POLYHYDRAMNIOS AFFECTING PREGNANCY IN THIRD TRIMESTER: Primary | ICD-10-CM

## 2018-06-19 ENCOUNTER — ROUTINE PRENATAL (OUTPATIENT)
Dept: OBSTETRICS AND GYNECOLOGY | Facility: CLINIC | Age: 23
End: 2018-06-19
Payer: MEDICAID

## 2018-06-19 ENCOUNTER — HOSPITAL ENCOUNTER (OUTPATIENT)
Dept: PERINATAL CARE | Facility: OTHER | Age: 23
Discharge: HOME | End: 2018-06-19
Attending: OBSTETRICS & GYNECOLOGY
Payer: MEDICAID

## 2018-06-19 VITALS
SYSTOLIC BLOOD PRESSURE: 104 MMHG | BODY MASS INDEX: 24.64 KG/M2 | DIASTOLIC BLOOD PRESSURE: 58 MMHG | WEIGHT: 134.69 LBS

## 2018-06-19 DIAGNOSIS — O40.3XX0 POLYHYDRAMNIOS AFFECTING PREGNANCY IN THIRD TRIMESTER: ICD-10-CM

## 2018-06-19 DIAGNOSIS — Z34.80 SUPERVISION OF OTHER NORMAL PREGNANCY, ANTEPARTUM: Primary | ICD-10-CM

## 2018-06-19 PROCEDURE — 99999 PR PBB SHADOW E&M-EST. PATIENT-LVL II: CPT | Mod: PBBFAC,,, | Performed by: OBSTETRICS & GYNECOLOGY

## 2018-06-19 PROCEDURE — 76819 FETAL BIOPHYS PROFIL W/O NST: CPT | Mod: 26,,, | Performed by: OBSTETRICS & GYNECOLOGY

## 2018-06-19 PROCEDURE — 99213 OFFICE O/P EST LOW 20 MIN: CPT | Mod: TH,S$PBB,, | Performed by: OBSTETRICS & GYNECOLOGY

## 2018-06-19 PROCEDURE — 76819 FETAL BIOPHYS PROFIL W/O NST: CPT

## 2018-06-19 PROCEDURE — 99212 OFFICE O/P EST SF 10 MIN: CPT | Mod: PBBFAC,TH | Performed by: OBSTETRICS & GYNECOLOGY

## 2018-06-19 NOTE — PROGRESS NOTES
Pt doing well. No vaginal bleeding, no loss of fluid, positive fetal movement, no contractions. Bleeding/labor/rom/fmc precautions.     PNT today for Poly. RTC 2 weeks. Induction at 39 weeks if undelivered.

## 2018-06-25 ENCOUNTER — OFFICE VISIT (OUTPATIENT)
Dept: MATERNAL FETAL MEDICINE | Facility: CLINIC | Age: 23
End: 2018-06-25
Payer: MEDICAID

## 2018-06-25 DIAGNOSIS — O40.3XX0 POLYHYDRAMNIOS AFFECTING PREGNANCY IN THIRD TRIMESTER: ICD-10-CM

## 2018-06-25 PROCEDURE — 99499 UNLISTED E&M SERVICE: CPT | Mod: S$PBB,,, | Performed by: OBSTETRICS & GYNECOLOGY

## 2018-06-25 PROCEDURE — 76819 FETAL BIOPHYS PROFIL W/O NST: CPT | Mod: 26,S$PBB,, | Performed by: OBSTETRICS & GYNECOLOGY

## 2018-06-25 PROCEDURE — 76819 FETAL BIOPHYS PROFIL W/O NST: CPT | Mod: PBBFAC | Performed by: OBSTETRICS & GYNECOLOGY

## 2018-06-25 NOTE — PROGRESS NOTES
Obstetrical ultrasound completed today.  See report in imaging section of [facility].

## 2018-07-02 ENCOUNTER — PATIENT MESSAGE (OUTPATIENT)
Dept: OBSTETRICS AND GYNECOLOGY | Facility: CLINIC | Age: 23
End: 2018-07-02

## 2018-07-02 ENCOUNTER — OFFICE VISIT (OUTPATIENT)
Dept: MATERNAL FETAL MEDICINE | Facility: CLINIC | Age: 23
End: 2018-07-02
Payer: MEDICAID

## 2018-07-02 ENCOUNTER — ROUTINE PRENATAL (OUTPATIENT)
Dept: OBSTETRICS AND GYNECOLOGY | Facility: CLINIC | Age: 23
End: 2018-07-02
Payer: MEDICAID

## 2018-07-02 ENCOUNTER — LAB VISIT (OUTPATIENT)
Dept: LAB | Facility: OTHER | Age: 23
End: 2018-07-02
Attending: OBSTETRICS & GYNECOLOGY
Payer: MEDICAID

## 2018-07-02 VITALS — SYSTOLIC BLOOD PRESSURE: 98 MMHG | BODY MASS INDEX: 25.16 KG/M2 | WEIGHT: 137.56 LBS | DIASTOLIC BLOOD PRESSURE: 62 MMHG

## 2018-07-02 DIAGNOSIS — Z34.80 SUPERVISION OF OTHER NORMAL PREGNANCY, ANTEPARTUM: Primary | ICD-10-CM

## 2018-07-02 DIAGNOSIS — Z34.80 SUPERVISION OF OTHER NORMAL PREGNANCY, ANTEPARTUM: ICD-10-CM

## 2018-07-02 DIAGNOSIS — O40.3XX0 POLYHYDRAMNIOS AFFECTING PREGNANCY IN THIRD TRIMESTER: ICD-10-CM

## 2018-07-02 LAB
BASOPHILS # BLD AUTO: 0.02 K/UL
BASOPHILS NFR BLD: 0.1 %
DIFFERENTIAL METHOD: ABNORMAL
EOSINOPHIL # BLD AUTO: 0.1 K/UL
EOSINOPHIL NFR BLD: 0.7 %
ERYTHROCYTE [DISTWIDTH] IN BLOOD BY AUTOMATED COUNT: 14.5 %
HCT VFR BLD AUTO: 32.1 %
HGB BLD-MCNC: 9.8 G/DL
LYMPHOCYTES # BLD AUTO: 2.1 K/UL
LYMPHOCYTES NFR BLD: 15.3 %
MCH RBC QN AUTO: 24.3 PG
MCHC RBC AUTO-ENTMCNC: 30.5 G/DL
MCV RBC AUTO: 80 FL
MONOCYTES # BLD AUTO: 1.1 K/UL
MONOCYTES NFR BLD: 7.9 %
NEUTROPHILS # BLD AUTO: 10.2 K/UL
NEUTROPHILS NFR BLD: 74.1 %
PLATELET # BLD AUTO: 371 K/UL
PMV BLD AUTO: 9.7 FL
RBC # BLD AUTO: 4.03 M/UL
WBC # BLD AUTO: 13.75 K/UL

## 2018-07-02 PROCEDURE — 76819 FETAL BIOPHYS PROFIL W/O NST: CPT | Mod: 26,S$PBB,, | Performed by: OBSTETRICS & GYNECOLOGY

## 2018-07-02 PROCEDURE — 99213 OFFICE O/P EST LOW 20 MIN: CPT | Mod: PBBFAC,TH | Performed by: OBSTETRICS & GYNECOLOGY

## 2018-07-02 PROCEDURE — 99999 PR PBB SHADOW E&M-EST. PATIENT-LVL III: CPT | Mod: PBBFAC,,, | Performed by: OBSTETRICS & GYNECOLOGY

## 2018-07-02 PROCEDURE — 86592 SYPHILIS TEST NON-TREP QUAL: CPT

## 2018-07-02 PROCEDURE — 36415 COLL VENOUS BLD VENIPUNCTURE: CPT

## 2018-07-02 PROCEDURE — 85025 COMPLETE CBC W/AUTO DIFF WBC: CPT

## 2018-07-02 PROCEDURE — 99213 OFFICE O/P EST LOW 20 MIN: CPT | Mod: TH,S$PBB,, | Performed by: OBSTETRICS & GYNECOLOGY

## 2018-07-02 PROCEDURE — 76819 FETAL BIOPHYS PROFIL W/O NST: CPT | Mod: PBBFAC | Performed by: OBSTETRICS & GYNECOLOGY

## 2018-07-02 PROCEDURE — 99499 UNLISTED E&M SERVICE: CPT | Mod: S$PBB,,, | Performed by: OBSTETRICS & GYNECOLOGY

## 2018-07-02 PROCEDURE — 86703 HIV-1/HIV-2 1 RESULT ANTBDY: CPT

## 2018-07-02 PROCEDURE — 87081 CULTURE SCREEN ONLY: CPT

## 2018-07-02 RX ORDER — FERROUS SULFATE 325(65) MG
325 TABLET ORAL DAILY
Qty: 30 TABLET | Refills: 3 | Status: SHIPPED | OUTPATIENT
Start: 2018-07-02 | End: 2019-07-02

## 2018-07-02 NOTE — PROGRESS NOTES
Obstetrical ultrasound completed today.  See report in imaging section of [facility].

## 2018-07-02 NOTE — PROGRESS NOTES
Pt doing well. No vaginal bleeding, no loss of fluid, positive fetal movement, no contractions. Bleeding/labor/rom/fmc precautions.     Induction scheduled for 7/26/18 @ 4:30 am    Growth u/s today showed polyhydramnios is stable. GBBS done. Labs today. RTC 2 weeks with SPADD appt.

## 2018-07-03 LAB
HIV 1+2 AB+HIV1 P24 AG SERPL QL IA: NEGATIVE
RPR SER QL: NORMAL

## 2018-07-05 LAB — BACTERIA SPEC AEROBE CULT: NORMAL

## 2018-07-17 ENCOUNTER — OFFICE VISIT (OUTPATIENT)
Dept: MATERNAL FETAL MEDICINE | Facility: CLINIC | Age: 23
End: 2018-07-17
Payer: MEDICAID

## 2018-07-17 ENCOUNTER — ROUTINE PRENATAL (OUTPATIENT)
Dept: OBSTETRICS AND GYNECOLOGY | Facility: CLINIC | Age: 23
End: 2018-07-17
Payer: MEDICAID

## 2018-07-17 VITALS
BODY MASS INDEX: 26.17 KG/M2 | SYSTOLIC BLOOD PRESSURE: 104 MMHG | WEIGHT: 143.06 LBS | DIASTOLIC BLOOD PRESSURE: 60 MMHG

## 2018-07-17 DIAGNOSIS — O35.9XX0 FETAL ABNORMALITY AFFECTING MANAGEMENT OF MOTHER, SINGLE OR UNSPECIFIED FETUS: ICD-10-CM

## 2018-07-17 DIAGNOSIS — O40.3XX0 POLYHYDRAMNIOS AFFECTING PREGNANCY IN THIRD TRIMESTER: ICD-10-CM

## 2018-07-17 DIAGNOSIS — Z34.80 SUPERVISION OF OTHER NORMAL PREGNANCY, ANTEPARTUM: Primary | ICD-10-CM

## 2018-07-17 PROCEDURE — 76819 FETAL BIOPHYS PROFIL W/O NST: CPT | Mod: 26,S$PBB,, | Performed by: OBSTETRICS & GYNECOLOGY

## 2018-07-17 PROCEDURE — 76816 OB US FOLLOW-UP PER FETUS: CPT | Mod: PBBFAC | Performed by: OBSTETRICS & GYNECOLOGY

## 2018-07-17 PROCEDURE — 76819 FETAL BIOPHYS PROFIL W/O NST: CPT | Mod: PBBFAC | Performed by: OBSTETRICS & GYNECOLOGY

## 2018-07-17 PROCEDURE — 99213 OFFICE O/P EST LOW 20 MIN: CPT | Mod: TH,S$PBB,, | Performed by: OBSTETRICS & GYNECOLOGY

## 2018-07-17 PROCEDURE — 99213 OFFICE O/P EST LOW 20 MIN: CPT | Mod: PBBFAC,TH,25 | Performed by: OBSTETRICS & GYNECOLOGY

## 2018-07-17 PROCEDURE — 99999 PR PBB SHADOW E&M-EST. PATIENT-LVL III: CPT | Mod: PBBFAC,,, | Performed by: OBSTETRICS & GYNECOLOGY

## 2018-07-17 PROCEDURE — 99499 UNLISTED E&M SERVICE: CPT | Mod: S$PBB,,, | Performed by: OBSTETRICS & GYNECOLOGY

## 2018-07-17 PROCEDURE — 76816 OB US FOLLOW-UP PER FETUS: CPT | Mod: 26,S$PBB,, | Performed by: OBSTETRICS & GYNECOLOGY

## 2018-07-17 NOTE — PROGRESS NOTES
Pt doing well. No vaginal bleeding, no loss of fluid, positive fetal movement, no contractions. Bleeding/labor/rom/fmc precautions.     Induction next week for Polyhydramnios. Pitocin ONLY. Pt would like to get epidural PRIOR to starting Pitocin. RTC 6 weeks.

## 2018-07-17 NOTE — PROGRESS NOTES
Obstetrical ultrasound completed today.  See report in imaging section of [facility].

## 2018-07-19 ENCOUNTER — HOSPITAL ENCOUNTER (INPATIENT)
Facility: OTHER | Age: 23
LOS: 3 days | Discharge: HOME | End: 2018-07-22
Attending: OBSTETRICS & GYNECOLOGY | Admitting: OBSTETRICS & GYNECOLOGY
Payer: MEDICAID

## 2018-07-19 DIAGNOSIS — Z37.9 NORMAL LABOR: ICD-10-CM

## 2018-07-19 DIAGNOSIS — Z3A.38 38 WEEKS GESTATION OF PREGNANCY: ICD-10-CM

## 2018-07-19 PROCEDURE — 99285 EMERGENCY DEPT VISIT HI MDM: CPT | Mod: 25

## 2018-07-19 PROCEDURE — 4A0HXCZ MEASUREMENT OF PRODUCTS OF CONCEPTION, CARDIAC RATE, EXTERNAL APPROACH: ICD-10-PCS | Performed by: OBSTETRICS & GYNECOLOGY

## 2018-07-19 PROCEDURE — 11000001 HC ACUTE MED/SURG PRIVATE ROOM

## 2018-07-19 PROCEDURE — 59025 FETAL NON-STRESS TEST: CPT

## 2018-07-20 ENCOUNTER — ANESTHESIA EVENT (OUTPATIENT)
Dept: OBSTETRICS AND GYNECOLOGY | Facility: OTHER | Age: 23
End: 2018-07-20
Payer: MEDICAID

## 2018-07-20 ENCOUNTER — ANESTHESIA (OUTPATIENT)
Dept: OBSTETRICS AND GYNECOLOGY | Facility: OTHER | Age: 23
End: 2018-07-20
Payer: MEDICAID

## 2018-07-20 PROBLEM — Z37.9 NORMAL LABOR: Status: ACTIVE | Noted: 2018-07-20

## 2018-07-20 LAB
ABO + RH BLD: NORMAL
BASOPHILS # BLD AUTO: 0.02 K/UL
BASOPHILS NFR BLD: 0.2 %
BLD GP AB SCN CELLS X3 SERPL QL: NORMAL
BLOOD GROUP ANTIBODIES SERPL: NORMAL
DIFFERENTIAL METHOD: ABNORMAL
EOSINOPHIL # BLD AUTO: 0.1 K/UL
EOSINOPHIL NFR BLD: 0.9 %
ERYTHROCYTE [DISTWIDTH] IN BLOOD BY AUTOMATED COUNT: 15.2 %
HCT VFR BLD AUTO: 30.1 %
HGB BLD-MCNC: 9.1 G/DL
LYMPHOCYTES # BLD AUTO: 2.3 K/UL
LYMPHOCYTES NFR BLD: 18.1 %
MCH RBC QN AUTO: 23 PG
MCHC RBC AUTO-ENTMCNC: 30.2 G/DL
MCV RBC AUTO: 76 FL
MONOCYTES # BLD AUTO: 1.4 K/UL
MONOCYTES NFR BLD: 11 %
NEUTROPHILS # BLD AUTO: 8.7 K/UL
NEUTROPHILS NFR BLD: 68.4 %
PLATELET # BLD AUTO: 329 K/UL
PMV BLD AUTO: 9.6 FL
RBC # BLD AUTO: 3.96 M/UL
WBC # BLD AUTO: 12.71 K/UL

## 2018-07-20 PROCEDURE — 72100002 HC LABOR CARE, 1ST 8 HOURS

## 2018-07-20 PROCEDURE — 99283 EMERGENCY DEPT VISIT LOW MDM: CPT | Mod: 25,,, | Performed by: OBSTETRICS & GYNECOLOGY

## 2018-07-20 PROCEDURE — 59409 OBSTETRICAL CARE: CPT | Mod: AT,,, | Performed by: OBSTETRICS & GYNECOLOGY

## 2018-07-20 PROCEDURE — 63600175 PHARM REV CODE 636 W HCPCS: Performed by: STUDENT IN AN ORGANIZED HEALTH CARE EDUCATION/TRAINING PROGRAM

## 2018-07-20 PROCEDURE — 86870 RBC ANTIBODY IDENTIFICATION: CPT

## 2018-07-20 PROCEDURE — 51702 INSERT TEMP BLADDER CATH: CPT

## 2018-07-20 PROCEDURE — 11000001 HC ACUTE MED/SURG PRIVATE ROOM

## 2018-07-20 PROCEDURE — 86901 BLOOD TYPING SEROLOGIC RH(D): CPT

## 2018-07-20 PROCEDURE — 27000181 HC CABLE, IUPC

## 2018-07-20 PROCEDURE — 25000003 PHARM REV CODE 250: Performed by: STUDENT IN AN ORGANIZED HEALTH CARE EDUCATION/TRAINING PROGRAM

## 2018-07-20 PROCEDURE — 27200710 HC EPIDURAL INFUSION PUMP SET: Performed by: STUDENT IN AN ORGANIZED HEALTH CARE EDUCATION/TRAINING PROGRAM

## 2018-07-20 PROCEDURE — 59025 FETAL NON-STRESS TEST: CPT | Mod: 26,,, | Performed by: OBSTETRICS & GYNECOLOGY

## 2018-07-20 PROCEDURE — 0KQM0ZZ REPAIR PERINEUM MUSCLE, OPEN APPROACH: ICD-10-PCS | Performed by: OBSTETRICS & GYNECOLOGY

## 2018-07-20 PROCEDURE — 85025 COMPLETE CBC W/AUTO DIFF WBC: CPT

## 2018-07-20 PROCEDURE — 72200005 HC VAGINAL DELIVERY LEVEL II

## 2018-07-20 PROCEDURE — 99024 POSTOP FOLLOW-UP VISIT: CPT | Mod: ,,, | Performed by: OBSTETRICS & GYNECOLOGY

## 2018-07-20 PROCEDURE — 72100003 HC LABOR CARE, EA. ADDL. 8 HRS

## 2018-07-20 PROCEDURE — 59409 OBSTETRICAL CARE: CPT | Mod: AA,,, | Performed by: ANESTHESIOLOGY

## 2018-07-20 PROCEDURE — 27800517 HC TRAY,EPIDURAL-CONTINUOUS: Performed by: STUDENT IN AN ORGANIZED HEALTH CARE EDUCATION/TRAINING PROGRAM

## 2018-07-20 PROCEDURE — 62326 NJX INTERLAMINAR LMBR/SAC: CPT | Performed by: ANESTHESIOLOGY

## 2018-07-20 PROCEDURE — 25000003 PHARM REV CODE 250: Performed by: ANESTHESIOLOGY

## 2018-07-20 RX ORDER — CARBOPROST TROMETHAMINE 250 UG/ML
250 INJECTION, SOLUTION INTRAMUSCULAR
Status: DISCONTINUED | OUTPATIENT
Start: 2018-07-20 | End: 2018-07-22 | Stop reason: HOSPADM

## 2018-07-20 RX ORDER — FENTANYL/BUPIVACAINE/NS/PF 2MCG/ML-.1
PLASTIC BAG, INJECTION (ML) INJECTION CONTINUOUS PRN
Status: DISCONTINUED | OUTPATIENT
Start: 2018-07-20 | End: 2018-07-20

## 2018-07-20 RX ORDER — METHYLERGONOVINE MALEATE 0.2 MG/ML
200 INJECTION INTRAVENOUS
Status: DISCONTINUED | OUTPATIENT
Start: 2018-07-20 | End: 2018-07-22 | Stop reason: HOSPADM

## 2018-07-20 RX ORDER — METHYLERGONOVINE MALEATE 0.2 MG/ML
INJECTION INTRAVENOUS
Status: DISCONTINUED
Start: 2018-07-20 | End: 2018-07-20 | Stop reason: WASHOUT

## 2018-07-20 RX ORDER — FENTANYL/BUPIVACAINE/NS/PF 2MCG/ML-.1
PLASTIC BAG, INJECTION (ML) INJECTION
Status: DISPENSED
Start: 2018-07-20 | End: 2018-07-20

## 2018-07-20 RX ORDER — FENTANYL CITRATE 50 UG/ML
INJECTION, SOLUTION INTRAMUSCULAR; INTRAVENOUS
Status: COMPLETED
Start: 2018-07-20 | End: 2018-07-20

## 2018-07-20 RX ORDER — HYDROCORTISONE 25 MG/G
CREAM TOPICAL 3 TIMES DAILY PRN
Status: DISCONTINUED | OUTPATIENT
Start: 2018-07-20 | End: 2018-07-22 | Stop reason: HOSPADM

## 2018-07-20 RX ORDER — SODIUM CHLORIDE, SODIUM LACTATE, POTASSIUM CHLORIDE, CALCIUM CHLORIDE 600; 310; 30; 20 MG/100ML; MG/100ML; MG/100ML; MG/100ML
INJECTION, SOLUTION INTRAVENOUS CONTINUOUS
Status: DISCONTINUED | OUTPATIENT
Start: 2018-07-20 | End: 2018-07-20

## 2018-07-20 RX ORDER — MISOPROSTOL 200 UG/1
600 TABLET ORAL
Status: DISCONTINUED | OUTPATIENT
Start: 2018-07-20 | End: 2018-07-22 | Stop reason: HOSPADM

## 2018-07-20 RX ORDER — BUPIVACAINE HYDROCHLORIDE 2.5 MG/ML
INJECTION, SOLUTION EPIDURAL; INFILTRATION; INTRACAUDAL
Status: COMPLETED
Start: 2018-07-20 | End: 2018-07-20

## 2018-07-20 RX ORDER — IBUPROFEN 600 MG/1
600 TABLET ORAL EVERY 6 HOURS PRN
Status: DISCONTINUED | OUTPATIENT
Start: 2018-07-20 | End: 2018-07-22 | Stop reason: HOSPADM

## 2018-07-20 RX ORDER — OXYTOCIN/RINGER'S LACTATE 20/1000 ML
10 PLASTIC BAG, INJECTION (ML) INTRAVENOUS ONCE
Status: COMPLETED | OUTPATIENT
Start: 2018-07-20 | End: 2018-07-20

## 2018-07-20 RX ORDER — PRENATAL WITH FERROUS FUM AND FOLIC ACID 3080; 920; 120; 400; 22; 1.84; 3; 20; 10; 1; 12; 200; 27; 25; 2 [IU]/1; [IU]/1; MG/1; [IU]/1; MG/1; MG/1; MG/1; MG/1; MG/1; MG/1; UG/1; MG/1; MG/1; MG/1; MG/1
1 TABLET ORAL DAILY
Status: DISCONTINUED | OUTPATIENT
Start: 2018-07-21 | End: 2018-07-22 | Stop reason: HOSPADM

## 2018-07-20 RX ORDER — ONDANSETRON 8 MG/1
8 TABLET, ORALLY DISINTEGRATING ORAL EVERY 8 HOURS PRN
Status: DISCONTINUED | OUTPATIENT
Start: 2018-07-20 | End: 2018-07-20

## 2018-07-20 RX ORDER — OXYTOCIN/RINGER'S LACTATE 20/1000 ML
41.65 PLASTIC BAG, INJECTION (ML) INTRAVENOUS CONTINUOUS
Status: DISCONTINUED | OUTPATIENT
Start: 2018-07-20 | End: 2018-07-20 | Stop reason: SDUPTHER

## 2018-07-20 RX ORDER — PROMETHAZINE HYDROCHLORIDE 25 MG/1
25 TABLET ORAL EVERY 6 HOURS PRN
Status: DISCONTINUED | OUTPATIENT
Start: 2018-07-20 | End: 2018-07-22 | Stop reason: HOSPADM

## 2018-07-20 RX ORDER — OXYTOCIN/RINGER'S LACTATE 20/1000 ML
2 PLASTIC BAG, INJECTION (ML) INTRAVENOUS CONTINUOUS
Status: DISCONTINUED | OUTPATIENT
Start: 2018-07-20 | End: 2018-07-20

## 2018-07-20 RX ORDER — MISOPROSTOL 200 UG/1
TABLET ORAL
Status: DISCONTINUED
Start: 2018-07-20 | End: 2018-07-20 | Stop reason: WASHOUT

## 2018-07-20 RX ORDER — IRON POLYSACCHARIDE COMPLEX 150 MG
150 CAPSULE ORAL DAILY
Status: DISCONTINUED | OUTPATIENT
Start: 2018-07-21 | End: 2018-07-22 | Stop reason: HOSPADM

## 2018-07-20 RX ORDER — DIPHENHYDRAMINE HCL 25 MG
25 CAPSULE ORAL EVERY 4 HOURS PRN
Status: DISCONTINUED | OUTPATIENT
Start: 2018-07-20 | End: 2018-07-22 | Stop reason: HOSPADM

## 2018-07-20 RX ORDER — FENTANYL/BUPIVACAINE/NS/PF 2MCG/ML-.1
PLASTIC BAG, INJECTION (ML) INJECTION CONTINUOUS
Status: DISCONTINUED | OUTPATIENT
Start: 2018-07-20 | End: 2018-07-20

## 2018-07-20 RX ORDER — CARBOPROST TROMETHAMINE 250 UG/ML
INJECTION, SOLUTION INTRAMUSCULAR
Status: DISCONTINUED
Start: 2018-07-20 | End: 2018-07-20 | Stop reason: WASHOUT

## 2018-07-20 RX ORDER — HYDROCODONE BITARTRATE AND ACETAMINOPHEN 10; 325 MG/1; MG/1
1 TABLET ORAL EVERY 4 HOURS PRN
Status: DISCONTINUED | OUTPATIENT
Start: 2018-07-20 | End: 2018-07-22 | Stop reason: HOSPADM

## 2018-07-20 RX ORDER — AMMONIA 15 % (W/V)
0.3 AMPUL (EA) INHALATION CONTINUOUS PRN
Status: DISCONTINUED | OUTPATIENT
Start: 2018-07-20 | End: 2018-07-22 | Stop reason: HOSPADM

## 2018-07-20 RX ORDER — FENTANYL CITRATE 50 UG/ML
INJECTION, SOLUTION INTRAMUSCULAR; INTRAVENOUS
Status: DISCONTINUED | OUTPATIENT
Start: 2018-07-20 | End: 2018-07-20

## 2018-07-20 RX ORDER — HYDROCODONE BITARTRATE AND ACETAMINOPHEN 5; 325 MG/1; MG/1
1 TABLET ORAL EVERY 4 HOURS PRN
Status: DISCONTINUED | OUTPATIENT
Start: 2018-07-20 | End: 2018-07-22 | Stop reason: HOSPADM

## 2018-07-20 RX ORDER — FAMOTIDINE 10 MG/ML
20 INJECTION INTRAVENOUS ONCE
Status: DISCONTINUED | OUTPATIENT
Start: 2018-07-20 | End: 2018-07-20

## 2018-07-20 RX ORDER — OXYTOCIN/RINGER'S LACTATE 20/1000 ML
41.65 PLASTIC BAG, INJECTION (ML) INTRAVENOUS CONTINUOUS
Status: ACTIVE | OUTPATIENT
Start: 2018-07-20 | End: 2018-07-21

## 2018-07-20 RX ORDER — BUPIVACAINE HYDROCHLORIDE 2.5 MG/ML
INJECTION, SOLUTION EPIDURAL; INFILTRATION; INTRACAUDAL
Status: DISCONTINUED | OUTPATIENT
Start: 2018-07-20 | End: 2018-07-20

## 2018-07-20 RX ORDER — ONDANSETRON 8 MG/1
8 TABLET, ORALLY DISINTEGRATING ORAL EVERY 8 HOURS PRN
Status: DISCONTINUED | OUTPATIENT
Start: 2018-07-20 | End: 2018-07-22 | Stop reason: HOSPADM

## 2018-07-20 RX ORDER — DOCUSATE SODIUM 100 MG/1
100 CAPSULE, LIQUID FILLED ORAL 2 TIMES DAILY PRN
Status: DISCONTINUED | OUTPATIENT
Start: 2018-07-20 | End: 2018-07-22 | Stop reason: HOSPADM

## 2018-07-20 RX ORDER — DIPHENHYDRAMINE HYDROCHLORIDE 50 MG/ML
25 INJECTION INTRAMUSCULAR; INTRAVENOUS EVERY 4 HOURS PRN
Status: DISCONTINUED | OUTPATIENT
Start: 2018-07-20 | End: 2018-07-22 | Stop reason: HOSPADM

## 2018-07-20 RX ORDER — AMOXICILLIN 250 MG
1 CAPSULE ORAL NIGHTLY
Status: DISCONTINUED | OUTPATIENT
Start: 2018-07-20 | End: 2018-07-22 | Stop reason: HOSPADM

## 2018-07-20 RX ORDER — SODIUM CITRATE AND CITRIC ACID MONOHYDRATE 334; 500 MG/5ML; MG/5ML
30 SOLUTION ORAL ONCE
Status: DISCONTINUED | OUTPATIENT
Start: 2018-07-20 | End: 2018-07-20

## 2018-07-20 RX ORDER — SODIUM CHLORIDE 9 MG/ML
INJECTION, SOLUTION INTRAVENOUS
Status: DISCONTINUED | OUTPATIENT
Start: 2018-07-20 | End: 2018-07-20

## 2018-07-20 RX ADMIN — DEXTROSE, SODIUM CHLORIDE, SODIUM LACTATE, POTASSIUM CHLORIDE, AND CALCIUM CHLORIDE 500 ML: 5; .6; .31; .03; .02 INJECTION, SOLUTION INTRAVENOUS at 05:07

## 2018-07-20 RX ADMIN — SODIUM CHLORIDE, SODIUM LACTATE, POTASSIUM CHLORIDE, AND CALCIUM CHLORIDE: .6; .31; .03; .02 INJECTION, SOLUTION INTRAVENOUS at 01:07

## 2018-07-20 RX ADMIN — Medication 10 UNITS: at 08:07

## 2018-07-20 RX ADMIN — STANDARDIZED SENNA CONCENTRATE AND DOCUSATE SODIUM 1 TABLET: 8.6; 5 TABLET, FILM COATED ORAL at 08:07

## 2018-07-20 RX ADMIN — FENTANYL CITRATE 100 MCG: 50 INJECTION, SOLUTION INTRAMUSCULAR; INTRAVENOUS at 05:07

## 2018-07-20 RX ADMIN — HYDROCODONE BITARTRATE AND ACETAMINOPHEN 1 TABLET: 5; 325 TABLET ORAL at 11:07

## 2018-07-20 RX ADMIN — Medication 41.65 MILLI-UNITS/MIN: at 08:07

## 2018-07-20 RX ADMIN — IBUPROFEN 600 MG: 600 TABLET ORAL at 11:07

## 2018-07-20 RX ADMIN — ONDANSETRON 8 MG: 8 TABLET, ORALLY DISINTEGRATING ORAL at 06:07

## 2018-07-20 RX ADMIN — SODIUM CHLORIDE, SODIUM LACTATE, POTASSIUM CHLORIDE, AND CALCIUM CHLORIDE 1000 ML: .6; .31; .03; .02 INJECTION, SOLUTION INTRAVENOUS at 07:07

## 2018-07-20 RX ADMIN — Medication 10 ML/HR: at 07:07

## 2018-07-20 RX ADMIN — BUPIVACAINE HYDROCHLORIDE 5 ML: 2.5 INJECTION, SOLUTION EPIDURAL; INFILTRATION; INTRACAUDAL; PERINEURAL at 01:07

## 2018-07-20 RX ADMIN — Medication 2 MILLI-UNITS/MIN: at 07:07

## 2018-07-20 NOTE — ED PROVIDER NOTES
Encounter Date: 2018       History     Chief Complaint   Patient presents with    Rupture of Membranes     HPI     Maryann Jones is a 23 y.o. [id number] at 38w0d presents complaining of leakage of fluid since 0 today.    This IUP is complicated by cystic hygroma (resolved, no fetal cardiac issues), Rh negative status and anemia.  Patient denies contractions, denies vaginal bleeding, reports LOF.   Fetal Movement: normal.     Review of patient's allergies indicates:  No Known Allergies  No past medical history on file.  No past surgical history on file.  Family History   Problem Relation Age of Onset    No Known Problems Paternal Grandfather     No Known Problems Paternal Grandmother     No Known Problems Maternal Grandmother     No Known Problems Maternal Grandfather     No Known Problems Father     No Known Problems Mother     No Known Problems Sister     Cancer Neg Hx     Colon cancer Neg Hx     Ovarian cancer Neg Hx     Congenital heart disease Neg Hx     Pacemaker/defibrilator Neg Hx      Social History   Substance Use Topics    Smoking status: Never Smoker    Smokeless tobacco: Never Used    Alcohol use No      Comment: Pregnant     Review of Systems   Constitutional: Negative for fever.   HENT: Negative for sore throat.    Respiratory: Negative for shortness of breath.    Cardiovascular: Negative for chest pain.   Gastrointestinal: Negative for nausea.   Genitourinary: Positive for vaginal discharge. Negative for dysuria.   Musculoskeletal: Negative for back pain.   Skin: Negative for rash.   Neurological: Negative for weakness.   Hematological: Does not bruise/bleed easily.       Physical Exam     Initial Vitals   BP Pulse Resp Temp SpO2   18 2352 18 2353 18 2352 18 2352 18 2352   110/60 (!) 111 18 97.5 °F (36.4 °C) 99 %      MAP       --                Physical Exam    Vitals reviewed.  Constitutional: She appears well-developed and well-nourished. She is  not diaphoretic. No distress.   HENT:   Head: Normocephalic and atraumatic.   Nose: Nose normal.   Cardiovascular: Normal rate, regular rhythm, normal heart sounds and intact distal pulses.   Pulmonary/Chest: Breath sounds normal. No respiratory distress. She exhibits no tenderness.   Abdominal: Soft. She exhibits no distension. There is no tenderness. There is no rebound.   Neurological: She is alert and oriented to person, place, and time. She has normal strength. No sensory deficit.   Skin: Skin is warm and dry. Capillary refill takes less than 2 seconds.   Psychiatric: She has a normal mood and affect. Her behavior is normal. Judgment and thought content normal.     OB LABOR EXAM:   Pre-Term Labor: No.   Membranes ruptured: Yes.   Method: Sterile vaginal exam per MD and Sterile speculum exam per MD.   Vaginal Bleeding: none present.   Engagement: ballotable/floating.   Dilatation: 3.   Station: -3.   Effacement: 60%.   Amniotic Fluid Color: normal.   Amniotic Fluid Amount: small.   Comments: SSE: +nitrazine, +pooling, negative ferning       ED Course   Obtain Fetal nonstress test (NST)  Date/Time: 7/20/2018 12:01 AM  Performed by: LAUREN GARCIA  Authorized by: LAUREN GARCIA     Nonstress Test:     Variability:  6-25 BPM    Decelerations:  None    Accelerations:  15 bpm    Baseline:  140    Uterine Irritability: No      Contractions:  Not present  Biophysical Profile:     Nonstress Test Interpretation: reactive      Overall Impression:  Reassuring        Labs Reviewed - No data to display       Imaging Results    None          Medical Decision Making:   ED Management:  Admitted to L&D for active management of ROM/labor  See H&P for details              Attending Attestation:   Physician Attestation Statement for Resident:  As the supervising MD   Physician Attestation Statement: I have personally seen and examined this patient.   I agree with the above history. -:   As the supervising MD I agree with the  above PE.    As the supervising MD I agree with the above treatment, course, plan, and disposition.  I was personally present during the critical portions of the procedure(s) performed by the resident and was immediately available in the ED to provide services and assistance as needed during the entire procedure.  I have reviewed and agree with the residents interpretation of the following: rhythm strips.  I have reviewed the following: old records at this facility.                       Clinical Impression:   The primary encounter diagnosis was Normal labor. A diagnosis of 38 weeks gestation of pregnancy was also pertinent to this visit.                             Sabrina Sapp MD  07/20/18 0616

## 2018-07-20 NOTE — PLAN OF CARE
Problem: Patient Care Overview  Goal: Plan of Care Review  Outcome: Ongoing (interventions implemented as appropriate)  Pt was 6/80/-2 at 1422.  Pitocin infusing at 22 mUnits; MVUs ranging from 150s-190s.  LR infusing at 125 ml/hr.  Epidural working well.  U/O adequate.  VSS.

## 2018-07-20 NOTE — H&P
HISTORY AND PHYSICAL                                                OBSTETRICS          Subjective:       Maryann Jones is a 23 y.o.  female with IUP at 38w1d weeks gestation who presents with SROM at 2200.       This IUP is complicated by complicated by cystic hygroma (resolved, no fetal cardiac issues), Rh negative status and anemia.  Patient denies contractions, denies vaginal bleeding, reports LOF.   Fetal Movement: normal.     PMHx: No past medical history on file.    PSHx: No past surgical history on file.    All: Review of patient's allergies indicates:  No Known Allergies    Meds:   (Not in a hospital admission)    SH:   Social History     Social History    Marital status: Single     Spouse name: N/A    Number of children: N/A    Years of education: N/A     Occupational History    Not on file.     Social History Main Topics    Smoking status: Never Smoker    Smokeless tobacco: Never Used    Alcohol use No      Comment: Pregnant    Drug use: No    Sexual activity: Yes     Partners: Male     Birth control/ protection: None     Other Topics Concern    Not on file     Social History Narrative    No narrative on file       FH:   Family History   Problem Relation Age of Onset    No Known Problems Paternal Grandfather     No Known Problems Paternal Grandmother     No Known Problems Maternal Grandmother     No Known Problems Maternal Grandfather     No Known Problems Father     No Known Problems Mother     No Known Problems Sister     Cancer Neg Hx     Colon cancer Neg Hx     Ovarian cancer Neg Hx     Congenital heart disease Neg Hx     Pacemaker/defibrilator Neg Hx        OBHx:   Obstetric History       T1      L1     SAB0   TAB0   Ectopic0   Multiple0   Live Births1       # Outcome Date GA Lbr Nikolas/2nd Weight Sex Delivery Anes PTL Lv   2 Current            1 Term 11/04/15    M Vag-Spont  N VERONICA          Objective:       /60   Pulse (!) 111   Temp 97.5 °F (36.4  °C)   Resp 18   LMP 10/26/2017   SpO2 99%   Breastfeeding? No     Vitals:    07/19/18 2352 07/19/18 2353   BP: 110/60    Pulse:  (!) 111   Resp: 18    Temp: 97.5 °F (36.4 °C)    SpO2: 99%        General:   alert, appears stated age and cooperative   Lungs:   clear to auscultation bilaterally   Heart:   regular rate and rhythm, S1, S2 normal, no murmur, click, rub or gallop   Abdomen:  soft, non-tender; bowel sounds normal; no masses,  no organomegaly   Extremities negative edema, negative erythema   FHT: 140, +accels, no decels, moderate btbv Cat 1 (reassuring)                 TOCO: irregular   Presentations: cephalic by ultrasound   Cervix:     Dilation: 3cm    Effacement: 60%    Station:  -3    Consistency: soft    Position: posterior     Sterile Speculum Exam: +pooling, +nitrazine, negative ferning    Lab Review  Blood Type A NEG  GBBS: negative  Rubella: Immune  RPR: Non-reactive  HIV: negative  HepB: negative       Assessment:       38w1d weeks gestation who presents for normal labor    Active Hospital Problems    Diagnosis  POA    Normal labor [O80, Z37.9]  Not Applicable      Resolved Hospital Problems    Diagnosis Date Resolved POA   No resolved problems to display.          Plan:      Risks, benefits, alternatives and possible complications have been discussed in detail with the patient.   - Consents signed and to chart  - Admit to Labor and Delivery unit  - Start pitocin per pitocin protocol  - Epidural per Anesthesia  - Draw CBC, T&S  - Notify Staff  - Rh negative status: RhoGAM post delivery  - Recheck in 2  hrs or PRN    Post-Partum Hemorrhage risk - low        Shawna Reno MD  OB/GYN  PGY-1

## 2018-07-20 NOTE — ANESTHESIA PREPROCEDURE EVALUATION
Maryann Jones is a 23 y.o. female with IUP at 38/1 who presented with SROM in labor. Previous fetal cystic hygroma which resolved.     OB History    Para Term  AB Living   2 1 1     1   SAB TAB Ectopic Multiple Live Births           1      # Outcome Date GA Lbr Nikolas/2nd Weight Sex Delivery Anes PTL Lv   2 Current            1 Term 11/04/15    M Vag-Spont  N VERONICA          Wt Readings from Last 1 Encounters:   18 1418 64.9 kg (143 lb 1.3 oz)       BP Readings from Last 3 Encounters:   18 110/60   18 104/60   18 98/62       Patient Active Problem List   Diagnosis    Rh negative state in antepartum period    LGSIL on Pap smear of cervix- needs repeat pap 2019    Normal labor       No past surgical history on file.    Social History     Social History    Marital status: Single     Spouse name: N/A    Number of children: N/A    Years of education: N/A     Occupational History    Not on file.     Social History Main Topics    Smoking status: Never Smoker    Smokeless tobacco: Never Used    Alcohol use No      Comment: Pregnant    Drug use: No    Sexual activity: Yes     Partners: Male     Birth control/ protection: None     Other Topics Concern    Not on file     Social History Narrative    No narrative on file         Chemistry    No results found for: NA, K, CL, CO2, BUN, CREATININE, GLU No results found for: CALCIUM, ALKPHOS, AST, ALT, BILITOT, ESTGFRAFRICA, EGFRNONAA         Lab Results   Component Value Date    WBC 12.71 (H) 2018    HGB 9.1 (L) 2018    HCT 30.1 (L) 2018    MCV 76 (L) 2018     2018       No results for input(s): PT, INR, PROTIME, APTT in the last 72 hours.                Anesthesia Evaluation    I have reviewed the Patient Summary Reports.     I have reviewed the Medications.     Review of Systems  Anesthesia Hx:  No problems with previous Anesthesia Denies Hx of Anesthetic complications  Neg history of prior  surgery.  Denies Personal Hx of Anesthesia complications.   Hematology/Oncology:  Hematology Normal        Cardiovascular:  Cardiovascular Normal     Pulmonary:  Pulmonary Normal    Renal/:  Renal/ Normal     Hepatic/GI:  Hepatic/GI Normal    Endocrine:  Endocrine Normal        Physical Exam  General:  Well nourished    Airway/Jaw/Neck:  Airway Findings: Mouth Opening: Normal Tongue: Normal  General Airway Assessment: Adult  Mallampati: II  Improves to I with phonation.  TM Distance: Normal, at least 6 cm  Jaw/Neck Findings:  Neck ROM: Normal ROM      Dental:  Dental Findings: In tact   Chest/Lungs:  Chest/Lungs Findings: Clear to auscultation     Heart/Vascular:  Heart Findings: Rate: Normal  Rhythm: Regular Rhythm  Sounds: Normal  Heart murmur: negative       Mental Status:  Mental Status Findings:  Alert and Oriented, Cooperative         Anesthesia Plan  Type of Anesthesia, risks & benefits discussed:  Anesthesia Type:  CSE, epidural, general, spinal  Patient's Preference:   Intra-op Monitoring Plan:   Intra-op Monitoring Plan Comments:   Post Op Pain Control Plan:   Post Op Pain Control Plan Comments:   Induction:    Beta Blocker:  Patient is not currently on a Beta-Blocker (No further documentation required).       Informed Consent: Patient understands risks and agrees with Anesthesia plan.  Questions answered. Anesthesia consent signed with patient.  ASA Score: 2     Day of Surgery Review of History & Physical:    H&P update referred to the surgeon.         Ready For Surgery From Anesthesia Perspective.

## 2018-07-20 NOTE — ANESTHESIA PROCEDURE NOTES
Epidural    Patient location during procedure: OB   Reason for block: primary anesthetic   Diagnosis: IUP   Start time: 7/20/2018 7:20 AM  Timeout: 7/20/2018 7:20 AM  End time: 7/20/2018 7:40 AM  Staffing  Anesthesiologist: RODERICK LYLES  Resident/CRNA: MIGEL ROSALES  Performed: resident/CRNA   Preanesthetic Checklist  Completed: patient identified, site marked, surgical consent, pre-op evaluation, timeout performed, IV checked, risks and benefits discussed, monitors and equipment checked, anesthesia consent given, hand hygiene performed and patient being monitored  Preparation  Patient position: sitting  Prep: ChloraPrep  Patient monitoring: Pulse Ox and Blood Pressure  Epidural  Skin Anesthetic: lidocaine 1%  Skin Wheal: 3 mL  Administration type: continuous  Approach: midline  Interspace: L4-5  Injection technique: SOFIA saline  Needle and Epidural Catheter  Needle type: Tuohy   Needle gauge: 17  Needle length: 3.5 inches  Needle insertion depth: 4.5 cm  Catheter type: springwound  Catheter size: 19 G  Catheter at skin depth: 9 cm  Test dose: 3 mL of lidocaine 1.5% with Epi 1-to-200,000  Additional Documentation: incremental injection, negative aspiration for heme and CSF, no paresthesia on injection, no signs/symptoms of IV or SA injection, no significant pain on injection and no significant complaints from patient  Needle localization: anatomical landmarks  Medications:  Bolus administered: 10 mL of 0.1% bupivacaine  Opioid administered: 20 mcg of   fentanyl  Assessment  Ease of block: easy  Patient's tolerance of the procedure: comfortable throughout block and no complaints

## 2018-07-20 NOTE — PROGRESS NOTES
"LABOR NOTE    MD at beside for cervical check.   S:  Complaints: No.  Epidural working:  not applicable    O: /60   Pulse (!) 111   Temp 97.5 °F (36.4 °C)   Resp 18   Ht 5' 2.01" (1.575 m)   Wt 64.9 kg (143 lb 1.3 oz)   LMP 10/26/2017   SpO2 99%   Breastfeeding? No   BMI 26.16 kg/m²       FHT: 150, +accels, no decels, moderate btbv, Cat 1 (reassuring)  CTX: none  SVE: 4/60/-3      ASSESSMENT:   23 y.o.  at 38w1d who presented for PROM    FHT reassuring    Active Hospital Problems    Diagnosis  POA    Normal labor [O80, Z37.9]  Not Applicable      Resolved Hospital Problems    Diagnosis Date Resolved POA   No resolved problems to display.       Labor course:  0000: 3/60/-3  0300: 4/60/-3, not on pitocin     PLAN:  Continue Close Maternal/Fetal Monitoring  Patient wishes to wait for pitocin until  arrives at approx 1100  Recheck 2 hours or PRN    Shawna Reno MD  OB/GYN  PGY-1      "

## 2018-07-20 NOTE — PROGRESS NOTES
"S: Maryann is a 23 y.o.  at 38w1d. She is doing well. Pt reports is comfortable    O:   Blood pressure (!) 105/56, pulse 105, temperature 97.2 °F (36.2 °C), temperature source Temporal, resp. rate 16, height 5' 2.01" (1.575 m), weight 64.9 kg (143 lb 1.3 oz), last menstrual period 10/26/2017, SpO2 100 %, not currently breastfeeding.     FHT: Category 1, moderate variability, (+) accelerations,   [place]/IUPC: q2  SVE:  / -1      ASSESSMENT: 38w1d   Active Hospital Problems    Diagnosis  POA    Normal labor [O80, Z37.9]  Not Applicable      Resolved Hospital Problems    Diagnosis Date Resolved POA   No resolved problems to display.       PLAN:  Continue Close Maternal/Fetal Monitoring      Kiana De CNM   "

## 2018-07-20 NOTE — PROGRESS NOTES
"LABOR NOTE    MD at beside for cervical check.   S:  Complaints: No.  Epidural working:  not applicable    O: /60   Pulse (!) 111   Temp 97.5 °F (36.4 °C)   Resp 18   Ht 5' 2.01" (1.575 m)   Wt 64.9 kg (143 lb 1.3 oz)   LMP 10/26/2017   SpO2 99%   Breastfeeding? No   BMI 26.16 kg/m²       FHT: 145, +accels, no decels, moderate btbv, Cat 1 (reassuring)  CTX: q5  SVE: 4/60/-3      ASSESSMENT:   23 y.o.  at 38w1d who presented for PROM    FHT reassuring    Active Hospital Problems    Diagnosis  POA    Normal labor [O80, Z37.9]  Not Applicable      Resolved Hospital Problems    Diagnosis Date Resolved POA   No resolved problems to display.       Labor course:  0000: 3/60/-3  0300: 3, not on pitocin   0630: -2, begin pitocin after epidural    PLAN:  Continue Close Maternal/Fetal Monitoring  Pitocin per protocol  Recheck 2 hours or PRN    Lucia Villegas M.D.  PGY-4 ObGyn  [phone]  "

## 2018-07-20 NOTE — PROGRESS NOTES
"LABOR NOTE    MD at beside for cervical check.   S:  Complaints: yes pelvic pressure.  Epidural working:  not applicable    O: BP (!) 105/56   Pulse (!) 114   Temp 97.8 °F (36.6 °C) (Temporal)   Resp 16   Ht 5' 2.01" (1.575 m)   Wt 64.9 kg (143 lb 1.3 oz)   LMP 10/26/2017   SpO2 100%   Breastfeeding? No   BMI 26.16 kg/m²       FHT: 145, +accels, no decels, moderate btbv, Cat 1 (reassuring)  CTX: q2  SVE: /0      ASSESSMENT:   23 y.o.  at 38w1d who presented for PROM    FHT reassuring    Active Hospital Problems    Diagnosis  POA    Normal labor [O80, Z37.9]  Not Applicable      Resolved Hospital Problems    Diagnosis Date Resolved POA   No resolved problems to display.       Labor course:  0000: 3/60/-3  0300: 60/-3, not on pitocin   0630: /-2, begin pitocin after epidural  1030: /-2  1420: 80/-1  1630: /0 pitocin on 22 mU      PLAN:  Continue Close Maternal/Fetal Monitoring  Pitocin per protocol  Recheck 2 hours or PRN    Lucia Villegas M.D.  PGY-4 ObGyn  [phone]  "

## 2018-07-21 PROBLEM — Z37.9 NORMAL LABOR: Status: RESOLVED | Noted: 2018-07-20 | Resolved: 2018-07-21

## 2018-07-21 LAB
ABO + RH BLD: NORMAL
BASOPHILS # BLD AUTO: 0.02 K/UL
BASOPHILS NFR BLD: 0.1 %
BLD GP AB SCN CELLS X3 SERPL QL: NORMAL
DIFFERENTIAL METHOD: ABNORMAL
EOSINOPHIL # BLD AUTO: 0.1 K/UL
EOSINOPHIL NFR BLD: 0.5 %
ERYTHROCYTE [DISTWIDTH] IN BLOOD BY AUTOMATED COUNT: 15.6 %
FETAL CELL SCN BLD QL ROSETTE: NORMAL
HCT VFR BLD AUTO: 25.4 %
HGB BLD-MCNC: 7.6 G/DL
LYMPHOCYTES # BLD AUTO: 2.3 K/UL
LYMPHOCYTES NFR BLD: 13.1 %
MCH RBC QN AUTO: 22.6 PG
MCHC RBC AUTO-ENTMCNC: 29.9 G/DL
MCV RBC AUTO: 75 FL
MONOCYTES # BLD AUTO: 1.9 K/UL
MONOCYTES NFR BLD: 11 %
NEUTROPHILS # BLD AUTO: 12.9 K/UL
NEUTROPHILS NFR BLD: 74.7 %
PLATELET # BLD AUTO: 266 K/UL
PMV BLD AUTO: 9.6 FL
RBC # BLD AUTO: 3.37 M/UL
WBC # BLD AUTO: 17.26 K/UL

## 2018-07-21 PROCEDURE — 36415 COLL VENOUS BLD VENIPUNCTURE: CPT

## 2018-07-21 PROCEDURE — 11000001 HC ACUTE MED/SURG PRIVATE ROOM

## 2018-07-21 PROCEDURE — 85461 HEMOGLOBIN FETAL: CPT

## 2018-07-21 PROCEDURE — 85025 COMPLETE CBC W/AUTO DIFF WBC: CPT

## 2018-07-21 PROCEDURE — 25000003 PHARM REV CODE 250: Performed by: STUDENT IN AN ORGANIZED HEALTH CARE EDUCATION/TRAINING PROGRAM

## 2018-07-21 PROCEDURE — 86901 BLOOD TYPING SEROLOGIC RH(D): CPT

## 2018-07-21 RX ORDER — IBUPROFEN 600 MG/1
600 TABLET ORAL EVERY 6 HOURS PRN
Qty: 60 TABLET | Refills: 3 | Status: SHIPPED | OUTPATIENT
Start: 2018-07-21

## 2018-07-21 RX ADMIN — STANDARDIZED SENNA CONCENTRATE AND DOCUSATE SODIUM 1 TABLET: 8.6; 5 TABLET, FILM COATED ORAL at 08:07

## 2018-07-21 RX ADMIN — HYDROCODONE BITARTRATE AND ACETAMINOPHEN 1 TABLET: 5; 325 TABLET ORAL at 05:07

## 2018-07-21 RX ADMIN — IBUPROFEN 600 MG: 600 TABLET ORAL at 11:07

## 2018-07-21 RX ADMIN — PRENATAL VIT W/ FE FUMARATE-FA TAB 27-0.8 MG 1 TABLET: 27-0.8 TAB at 08:07

## 2018-07-21 RX ADMIN — IBUPROFEN 600 MG: 600 TABLET ORAL at 05:07

## 2018-07-21 RX ADMIN — DOCUSATE SODIUM 100 MG: 100 CAPSULE, LIQUID FILLED ORAL at 08:07

## 2018-07-21 RX ADMIN — HYDROCODONE BITARTRATE AND ACETAMINOPHEN 1 TABLET: 5; 325 TABLET ORAL at 10:07

## 2018-07-21 RX ADMIN — HYDROCODONE BITARTRATE AND ACETAMINOPHEN 1 TABLET: 5; 325 TABLET ORAL at 08:07

## 2018-07-21 NOTE — L&D DELIVERY NOTE
[facility]  Vaginal Delivery   Operative Note    SUMMARY     Normal spontaneous vaginal delivery of live infant, was placed on mothers abdomen for skin to skin and bulb suctioning performed.  Infant delivered position OA over second degree laceration.  Nuchal cord: Yes, nuchal cord x2, reduced at the perineum.    Spontaneous delivery of placenta and IV pitocin given noting good uterine tone.  2nd degree laceration noted and repaired in normal fashion with 2-0 and 3-0 chromic.  Patient tolerated delivery well. Sponge needle and lap counted correctly x2.  [place] placed due to severe edema at labia and urethra      Indications:  (spontaneous vaginal delivery)  Pregnancy complicated by:   Patient Active Problem List   Diagnosis    Rh negative state in antepartum period    LGSIL on Pap smear of cervix- needs repeat pap 2019    Normal labor     (spontaneous vaginal delivery)     Admitting GA: 38w1d    Delivery Information for  Vernon Jones    Birth information:  YOB: 2020   Time of birth: 8:00 PM   Sex: male   Head Delivery Date/Time: 2018  7:59 PM   Delivery type: Vaginal, Spontaneous Delivery   Gestational Age: 38w1d    Delivery Providers    Delivering clinician:  Sabine Petty MD   Provider Role    Frances Crawley, RN Registered Nurse    Vinicio Hernandez, RN Registered Nurse    Britni Gu, RN Registered Nurse    Elif Rojas, [facility]    Susan Allen MD Resident            [place] Measurements    Weight:  3630 g  Length:  50.8 cm  Head circumference:  33.5 cm  Chest circumference:  34 cm         [place] Assessment    Living status:  Living  Apgars:     1 Minute:   5 Minute:   10 Minute:   15 Minute:   20 Minute:     Skin Color:   0  1       Heart Rate:   2  2       Reflex Irritability:   2  2       Muscle Tone:   2  2       Respiratory Effort:   2  2       Total:   8  9               Apgars Assigned By:  KLAUS         Assisted  Delivery Details:    Forceps attempted?:  No  Vacuum extractor attempted?:  No         Shoulder Dystocia    Shoulder dystocia present?:  No           Presentation and Position    Presentation:  Vertex  Position:  Left Occiput Anterior           Interventions/Resuscitation    Method:  Bulb Suctioning, Deep Suctioning, Tactile Stimulation       Cord    Vessels:  3 vessels  Complications:  Nuchal  Nuchal Intervention:  reduced  Nuchal Cord Description:  loose nuchal cord  Number of Loops:  2  Delayed Cord Clamping?:  No  Cord Clamped Date/Time:  2018  8:01 PM  Cord Blood Disposition:  Sent with Baby  Gases Sent?:  No  Stem Cell Collection (by MD):  No       Placenta    Date and time:  2018  8:05 PM  Removal:  Spontaneous  Appearance:  Intact  Placenta disposition:  discarded           Labor Events:       labor: No     Labor Onset Date/Time:         Dilation Complete Date/Time:         Start Pushing Date/Time: 2018 19:15     Rupture Date/Time: 18  2200         Rupture type:           Fluid Amount:        Fluid Color:        Fluid Odor:        Membrane Status (PeriCalm): SRM (Spontaneous Rupture)      Rupture Date/Time (PeriCalm): 2018 22:00:00      Fluid Amount (PeriCalm): Moderate      Fluid Color (PeriCalm): Clear       steroids: None     Antibiotics given for GBS: No     Induction: none     Indications for induction:        Augmentation: oxytocin     Indications for augmentation:       Labor complications: None     Additional complications:          Cervical ripening:                     Delivery:      Episiotomy: None     Indication for Episiotomy:       Perineal Lacerations: 2nd Repaired:  Yes   Periurethral Laceration: none Repaired:     Labial Laceration: none Repaired:     Sulcus Laceration: none Repaired:     Vaginal Laceration: No Repaired:     Cervical Laceration: No Repaired:     Repair suture:       Repair # of packets:       Vaginal delivery QBL (mL): 300       QBL (mL): 0     Combined Blood Loss (mL): 300     Vaginal Sweep Performed: Yes     Surgicount Correct: Yes       Other providers:       Anesthesia    Method:  Epidural          Details (if applicable):  Trial of Labor      Categorization:      Priority:     Indications for :     Incision Type:       Additional  information:  Forceps:    Vacuum:    Breech:    Observed anomalies    Other (Comments):

## 2018-07-21 NOTE — PROGRESS NOTES
Mother educated about the benifits of breastfeeding. after eduction but is still requesting formula to feed baby. educated family on amount of formula, feeding cues, baby led feds. verbalized understanding.        Baby Led Bottle Feeding education    Wash your hands.   Feed Baby on cue, not on a schedule. Babies give cues when ready to feed. Cues are soft sounds like grunts, moving arms and leg, licking lips, turning head to the side with an open mouth, and sucking hands/fingers.   Hold baby skin to skin during feedings. Look into babys eyes, talk to baby, and stroke baby while baby suckles.   Baby should be fed 8 or more times a day depending on babys cues. Some babies may be sleepy and may need to be awakened for their feeds to get the 8 feeds a day needed.   Hold the baby close while feeding and never prop a bottle.   Hold baby upright supporting head and neck.   Place the tip of nipple below babys nose, rubbing top lip and allow baby to open mouth and accept the nipple.   Hold the bottle horizontally, (level with the ground), tilt the bottle just enough to get milk in the nipple.   Watch for stress cues during feeding. Be alert for baby wrinkling eyebrow, baby turning head away from bottle, or getting fussy. Baby may need a break.   Once baby releases nipple or pulls away, do not force baby to finish bottle. Baby is full when sucks slow or stops, arms relax, turns away from nipple, pushes away or falls asleep.   Pace the feeding, feed slowly so that baby is given 15-20 minutes with breaks to burp every 10-15mls.   Alternate arms part way through feeding to allow stimulation to both babys eyes.   Use formula within one hour of starting feeding. Throw away left over formula.    Mother able to demonstrate baby led bottlefeeding

## 2018-07-21 NOTE — ANESTHESIA POSTPROCEDURE EVALUATION
"Anesthesia Post Evaluation    Patient: Maryann Jones    Procedure(s) Performed: * No procedures listed *    Final Anesthesia Type: epidural  Patient location during evaluation: labor & delivery  Patient participation: Yes- Able to Participate  Level of consciousness: awake and alert  Post-procedure vital signs: reviewed and stable  Pain management: adequate  Airway patency: patent  PONV status at discharge: No PONV  Anesthetic complications: no      Cardiovascular status: blood pressure returned to baseline  Respiratory status: unassisted  Hydration status: euvolemic  Follow-up not needed.        Visit Vitals  BP 91/60   Pulse 86   Temp 36.8 °C (98.3 °F) (Oral)   Resp 18   Ht 5' 2.01" (1.575 m)   Wt 64.9 kg (143 lb 1.3 oz)   LMP 10/26/2017   SpO2 98%   Breastfeeding? Yes   BMI 26.16 kg/m²       Pain/Elizabeth Score: Pain Rating Prior to Med Admin: 0 (7/21/2018 11:44 AM)  Pain Rating Post Med Admin: 0 (7/21/2018 11:44 AM)      "

## 2018-07-21 NOTE — PLAN OF CARE
Problem: Patient Care Overview  Goal: Plan of Care Review  Outcome: Ongoing (interventions implemented as appropriate)   Requested patient call lactation for latch assistance; patient will feed baby on cue until content at least 8 times in 24 hours observing for signs of milk transfer; she will wake baby prn;

## 2018-07-21 NOTE — PROGRESS NOTES
POSTPARTUM PROGRESS NOTE     Maryann Jones is a 23 y.o. female PPD #1 status post Spontaneous vaginal delivery at 38w1d in a pregnancy complicated by iron defiency anemia.   Patient is doing well this morning. She denies nausea, vomiting, fever or chills.  Patient reports mild abdominal pain that is well relieved by oral pain medications. Lochia is mild. Patient is not voiding, sanchez in place and ambulating with no difficulty. She has passed flatus, and has not had BM.  Patient does plan to breast feed. Per VINCENZO for contraception. She desires circumcision.     Objective:       Temp:  [96.9 °F (36.1 °C)-98.9 °F (37.2 °C)] 98.2 °F (36.8 °C)  Pulse:  [] 94  Resp:  [16-18] 16  SpO2:  [98 %-100 %] 100 %  BP: ()/(36-63) 101/56    General:   alert, appears stated age and cooperative   Lungs:   clear to auscultation bilaterally   Heart:   regular rate and rhythm, S1, S2 normal, no murmur, click, rub or gallop   Abdomen:  soft, non-tender; bowel sounds normal; no masses,  no organomegaly   Uterus:  firm located at the umblicus.            Extremities: peripheral pulses normal, no pedal edema, no clubbing or cyanosis, no pedal edema noted     Lab Review  No results found for this or any previous visit (from the past 4 hour(s)).    I/O    Intake/Output Summary (Last 24 hours) at 18 2321  Last data filed at 18   Gross per 24 hour   Intake           2539.4 ml   Output             1755 ml   Net            784.4 ml        Assessment:     Patient Active Problem List   Diagnosis    Rh negative state in antepartum period    LGSIL on Pap smear of cervix- needs repeat pap 2019    Normal labor     (spontaneous vaginal delivery)        Plan:   1. Postpartum care:  - Patient doing well. Continue routine management and advances.  - Continue PO pain meds. Pain well controlled.  - Heme: H/h 9/30 > AM pending   - Encourage ambulation  - Circumcision consents signed  - Contraception per VINCENZO  - Lactation  following  - Rh status A neg  - Olmstead in place 2/2 to labial swelling, plan to remove later today     2. Rh negative status  - rhogam pp  - rhogam studies pending    3. Anemia  - Fe and colace started         Dispo: As patient meets milestones, will plan to discharge on PPD#2.    Elisha Burden      Hct 25.4, Hgb 7.6  RhoGam candidate  Doing well, no questions,no problems.  I have reviewed the resident's note, evaluated the patient and agree with the diagnosis and management plan

## 2018-07-21 NOTE — PLAN OF CARE
Problem: Patient Care Overview  Goal: Individualization & Mutuality  Outcome: Ongoing (interventions implemented as appropriate)  Vital signs stable, vaginal bleeding light, pain controlled by PO medication, discussed POC with pt, pt verbalized understanding. Olmstead still in place due to perineal swelling.

## 2018-07-21 NOTE — LACTATION NOTE
07/21/18 1325   Maternal Infant Feeding   Time Spent (min) 0-15 min   Lactation Interventions   Attachment Promotion counseling provided;privacy provided;role responsibility promoted;skin-to-skin contact encouraged   Breastfeeding Assistance feeding on demand promoted;support offered   Maternal Breastfeeding Support encouragement offered;lactation counseling provided;maternal hydration promoted   Advised patient on benefits of breastfeeding for her and her baby; encouraged breastfeeding; requested patient call lactation for assistance with breastfeeing; provided basic lactation education;

## 2018-07-21 NOTE — DISCHARGE INSTRUCTIONS
Breastfeeding Discharge Instructions       Feed the baby at the earliest sign of hunger or comfort  o Hands to mouth, sucking motions  o Rooting or searching for something to suck on  o Dont wait for crying - it is a sign of distress     The feedings may be 8-12 times per 24hrs and will not follow a schedule   Avoid pacifiers and bottles for the first 4 weeks   Alternate the breast you start the feeding with, or start with the breast that feels the fullest   Switch breasts when the baby takes himself off the breast or falls asleep   Keep offering breasts until the baby looks full, no longer gives hunger signs, and stays asleep when placed on his back in the crib   If the baby is sleepy and wont wake for a feeding, put the baby skin-to-skin dressed in a diaper against the mothers bare chest   Sleep near your baby   The baby should be positioned and latched on to the breast correctly  o Chest-to-chest, chin in the breast  o Babys lips are flipped outward  o Babys mouth is stretched open wide like a shout  o Babys sucking should feel like tugging to the mother  - The baby should be drinking at the breast:  o You should hear swallowing or gulping throughout the feeding  o You should see milk on the babys lips when he comes off the breast  o Your breasts should be softer when the baby is finished feeding  o The baby should look relaxed at the end of feedings  o After the 4th day and your milk is in:  o The babys poop should turn bright yellow and be loose, watery, and seedy  o The baby should have at least 3-4 poops the size of the palm of your hand per day  o The baby should have at least 5-6 wet diapers per day  o The urine should be light yellow in color  You should drink when you are thirsty and eat a healthy diet when you are    hungry.     Take naps to get the rest you need.   Take medications and/or drink alcohol only with permission of your obstetrician    or the babys pediatrician.  You can  also call the [facility] Risk [facility],   ([phone]), Monday-Friday, 8am-5pm Central time, to get the most   up-to-date evidence-based information on the use of medications during   pregnancy and breastfeeding.      The baby should be examined by a pediatrician at 3-5 days of age.  Once your   milk comes in, the baby should be gaining at least ½ - 1oz each day and should be back to birthweight no later than 10-14 days of age.          Community Resources    [facility] Breastfeeding Warmline: [phone]   Local [facility] clinics: provide incentives and breastpumps to eligible mothers  [employer] ([employer]LI):  mother-to-mother support group website        www.Degreedl.MyScienceWork  Local La Leche League mother-to-mother support groups:        www.Freshmilk NetTV        La Leche League [facility]   Dr. Terrell Luz website for latch videos and general information:        www.breastfeedinginc.ca  [facility] Risk [facility] is a call center that provides information about the safety of taking medications while breastfeeding.  Call 1-248.169.9082, M-F, 8am-5pm, [place].  International Lactation Consultant Association provides resources for assistance:        www.ilca.org  [place] Breastfeeding [employer] provides informationand resources for parents  and the community    www.LaBreastfeedingSupport.org     Kim Marrero is a mom-to-mom support group:                             www.[employer].com//breastfeedng-support/  [employer] for Healthy Babies:  3-483-852-BABY(4740)  [employer]: a breastfeeding support group for women of color, [phone]

## 2018-07-22 VITALS
RESPIRATION RATE: 18 BRPM | TEMPERATURE: 98 F | DIASTOLIC BLOOD PRESSURE: 55 MMHG | HEIGHT: 62 IN | SYSTOLIC BLOOD PRESSURE: 98 MMHG | WEIGHT: 143.06 LBS | OXYGEN SATURATION: 97 % | BODY MASS INDEX: 26.33 KG/M2 | HEART RATE: 97 BPM

## 2018-07-22 LAB — INJECT RH IG VOL PATIENT: NORMAL ML

## 2018-07-22 PROCEDURE — 25000003 PHARM REV CODE 250: Performed by: STUDENT IN AN ORGANIZED HEALTH CARE EDUCATION/TRAINING PROGRAM

## 2018-07-22 PROCEDURE — 63600519 RHOGAM PHARM REV CODE 636 ALT 250 W HCPCS: Performed by: STUDENT IN AN ORGANIZED HEALTH CARE EDUCATION/TRAINING PROGRAM

## 2018-07-22 PROCEDURE — 99232 SBSQ HOSP IP/OBS MODERATE 35: CPT | Mod: ,,, | Performed by: OBSTETRICS & GYNECOLOGY

## 2018-07-22 PROCEDURE — 3E0234Z INTRODUCTION OF SERUM, TOXOID AND VACCINE INTO MUSCLE, PERCUTANEOUS APPROACH: ICD-10-PCS | Performed by: OBSTETRICS & GYNECOLOGY

## 2018-07-22 RX ORDER — HYDROCODONE BITARTRATE AND ACETAMINOPHEN 5; 325 MG/1; MG/1
1 TABLET ORAL EVERY 4 HOURS PRN
Qty: 15 TABLET | Refills: 0 | Status: SHIPPED | OUTPATIENT
Start: 2018-07-22

## 2018-07-22 RX ADMIN — HUMAN RHO(D) IMMUNE GLOBULIN 300 MCG: 300 INJECTION, SOLUTION INTRAMUSCULAR at 04:07

## 2018-07-22 RX ADMIN — PRENATAL VIT W/ FE FUMARATE-FA TAB 27-0.8 MG 1 TABLET: 27-0.8 TAB at 09:07

## 2018-07-22 RX ADMIN — IBUPROFEN 600 MG: 600 TABLET ORAL at 05:07

## 2018-07-22 RX ADMIN — Medication 150 MG: at 09:07

## 2018-07-22 RX ADMIN — HYDROCODONE BITARTRATE AND ACETAMINOPHEN 1 TABLET: 10; 325 TABLET ORAL at 01:07

## 2018-07-22 NOTE — PROGRESS NOTES
POSTPARTUM PROGRESS NOTE     Maryann Jones is a 23 y.o. female PPD #2 status post Spontaneous vaginal delivery at 38w1d in a pregnancy complicated by cystic hygroma (resolved), Rh negative status and anemia. Patient is doing well this morning. She denies nausea, vomiting, fever or chills.  Patient reports mild abdominal pain that is adequately relieved by oral pain medications. Lochia is mild to moderate  and stable. Patient with sanchez in place secondary to periurethral and labial swelling. She has passed flatus, and has not had BM.  Patient does not plan to breast feed. Primary OB for contraception.     Objective:       Temp:  [98.1 °F (36.7 °C)-98.3 °F (36.8 °C)] 98.1 °F (36.7 °C)  Pulse:  [82-93] 93  Resp:  [18] 18  SpO2:  [97 %-99 %] 97 %  BP: (91-98)/(55-60) 93/55    General:   alert, appears stated age and cooperative   Lungs:   clear to auscultation bilaterally   Heart:   regular rate and rhythm, S1, S2 normal, no murmur, click, rub or gallop   Abdomen:  soft, non-tender; bowel sounds normal; no masses,  no organomegaly   Uterus:  firm located at the umblicus.    Extremities: peripheral pulses normal, no pedal edema, no clubbing or cyanosis   Vagina: bilateral labia and periurethral area swollen with sanchez in place    Lab Review  No results found for this or any previous visit (from the past 4 hour(s)).    I/O    Intake/Output Summary (Last 24 hours) at 18 0341  Last data filed at 18 2047   Gross per 24 hour   Intake                0 ml   Output             1825 ml   Net            -1825 ml        Assessment:     Patient Active Problem List   Diagnosis    Rh negative state in antepartum period    LGSIL on Pap smear of cervix- needs repeat pap 2019     (spontaneous vaginal delivery)        Plan:   1. Postpartum care:  - Patient doing well. Continue routine management and advances.  - Continue PO pain meds. Pain well controlled.  - Encourage ambulation  - Circumcision consents signed and  placed in chart  - Contraception per primary OB  - Lactation consult PRN    2. Acute blood loss anemia  - Heme: H/h 9.1/30.1 > 7.6/25.4  - asymptomatic; VSS  - fe/colace      Dispo: As patient meets milestones, will plan to discharge today.    Elaine Salazar MD   OBGYN, PGY-1

## 2018-07-22 NOTE — DISCHARGE SUMMARY
Delivery Discharge Summary  Obstetrics      Primary OB Clinician: Dr. Monae    Admission date: 2018  Discharge date: 2018    Disposition: To home, self care    Admit Dx:      Patient Active Problem List   Diagnosis    Rh negative state in antepartum period    LGSIL on Pap smear of cervix- needs repeat pap 2019     (spontaneous vaginal delivery)     Discharge Dx:    Patient Active Problem List   Diagnosis    Rh negative state in antepartum period    LGSIL on Pap smear of cervix- needs repeat pap 2019     (spontaneous vaginal delivery)       Procedure:     Hospital Course:  Maryann Jones is a 23 y.o. now , PPD #2 who was admitted on 2018 at 38w1d for spontaneous labor. On initial assessment, vital signs were stable and physical exam was normal. Infant was in cephalic presentation. Patient was subsequently admitted to labor and delivery unit with signed consents.  Patient delivered a single viable  male via . Please see delivery note for further details. Pt was in stable condition post delivery and was transferred to the Mother-Baby Unit. Olmstead was placed for 24 hours following delivery secondary to periurethral and labial swelling. Olmstead was removed without incidence and patient voided without difficulty. On discharge day, patient's pain is controlled with oral pain medications. Pt is tolerating ambulation without SOB or CP, and PO diet without N/V. Reports lochia is mild. Denies any HA, vision changes, F/C, LE swelling. Pt in stable condition and ready for discharge. Discharge instructions and precautions reviewed.    Pertinent studies:  Postpartum CBC  Lab Results   Component Value Date    WBC 17.26 (H) 2018    HGB 7.6 (L) 2018    HCT 25.4 (L) 2018    MCV 75 (L) 2018     2018       Tubal Ligation: n/a  Feeding Method: breast  Rh Immune Globulin Given(A NEG): to be done on discharge  Rubella Vaccine Given: N/A  Tdap Vaccine  Given: 5/15/18    Delivery:    Episiotomy: None   Lacerations: 2nd   Repair suture:     Repair # of packets:     Blood loss (ml): 300     Birth information:  YOB: 2020   Time of birth: 8:00 PM   Sex: male   Delivery type: Vaginal, Spontaneous Delivery   Gestational Age: 38w1d    Delivery Clinician:      Other providers:       Additional  information:  Forceps:    Vacuum:    Breech:    Observed anomalies      Living?:           APGARS  One minute Five minutes Ten minutes   Skin color:         Heart rate:         Grimace:         Muscle tone:         Breathing:         Totals: 8  9        Placenta: Delivered:       appearance      Patient Instructions:   Current Discharge Medication List      START taking these medications    Details   ibuprofen (ADVIL,MOTRIN) 600 MG tablet Take 1 tablet (600 mg total) by mouth every 6 (six) hours as needed (cramping).  Qty: 60 tablet, Refills: 3    Associated Diagnoses:  (spontaneous vaginal delivery)         CONTINUE these medications which have NOT CHANGED    Details   ferrous sulfate 325 mg (65 mg iron) Tab tablet Take 1 tablet (325 mg total) by mouth once daily.  Qty: 30 tablet, Refills: 3         STOP taking these medications       multivitamin (ONE DAILY MULTIVITAMIN) per tablet Comments:   Reason for Stopping:         ranitidine (ZANTAC) 150 MG capsule Comments:   Reason for Stopping:                 Discharge Procedure Orders  Diet Adult Regular     Diet Adult Regular     Other restrictions (specify):   Order Comments: Nothing in vagina for 6 weeks (no intercourse, douching, tampons, etc.)     Notify your health care provider if you experience any of the following:   Order Comments: Soaking through 1 pad/hour for greater than 2 hours     Notify your health care provider if you experience any of the following:  increased confusion or weakness     Notify your health care provider if you experience any of the following:  persistent dizziness, light-headedness,  or visual disturbances     Notify your health care provider if you experience any of the following:  severe persistent headache     Notify your health care provider if you experience any of the following:  difficulty breathing or increased cough     Notify your health care provider if you experience any of the following:  severe uncontrolled pain     Notify your health care provider if you experience any of the following:  persistent nausea and vomiting or diarrhea     Notify your health care provider if you experience any of the following:  temperature >100.4     Other restrictions (specify):   Order Comments: Nothing in vagina for 6 weeks (no intercourse, douching, tampons, etc.)     Notify your health care provider if you experience any of the following:   Order Comments: Soaking through 1 pad/hour for greater than 2 hours     Notify your health care provider if you experience any of the following:  increased confusion or weakness     Notify your health care provider if you experience any of the following:  persistent dizziness, light-headedness, or visual disturbances     Notify your health care provider if you experience any of the following:  severe persistent headache     Notify your health care provider if you experience any of the following:  difficulty breathing or increased cough     Notify your health care provider if you experience any of the following:  severe uncontrolled pain     Notify your health care provider if you experience any of the following:  persistent nausea and vomiting or diarrhea     Notify your health care provider if you experience any of the following:  temperature >100.4     Activity as tolerated     Activity as tolerated       Susan Allen MD  OBGYN, PGY-1

## 2018-07-22 NOTE — PLAN OF CARE
Problem: Postpartum (Vaginal Delivery) (Adult,Obstetrics,Pediatric)  Intervention: Support Life/Role Transition  Vital signs stable, vaginal bleeding light, pain controlled by PO medication, discussed POC with pt, pt verbalized understanding.

## 2018-07-22 NOTE — PLAN OF CARE
Problem: Patient Care Overview  Goal: Plan of Care Review  Outcome: Outcome(s) achieved Date Met: 07/22/18  Pt. Ambulating and voiding without difficulty. Pain well controlled with po pain medication. Pt. Denies any needs at this time. V/S WNL.

## 2018-07-26 ENCOUNTER — TELEPHONE (OUTPATIENT)
Dept: OBSTETRICS AND GYNECOLOGY | Facility: CLINIC | Age: 23
End: 2018-07-26

## 2018-07-26 NOTE — TELEPHONE ENCOUNTER
----- Message from Ubaldo Miller sent at 7/26/2018  1:55 PM CDT -----  Please call pp pt she deliver on 07/20  needs to schedule her pp appt next available that is coming up for me is in sept [phone]

## 2018-08-30 ENCOUNTER — POSTPARTUM VISIT (OUTPATIENT)
Dept: OBSTETRICS AND GYNECOLOGY | Facility: CLINIC | Age: 23
End: 2018-08-30
Payer: MEDICAID

## 2018-08-30 VITALS
HEIGHT: 62 IN | SYSTOLIC BLOOD PRESSURE: 112 MMHG | BODY MASS INDEX: 21.54 KG/M2 | WEIGHT: 117.06 LBS | DIASTOLIC BLOOD PRESSURE: 72 MMHG

## 2018-08-30 PROCEDURE — 99213 OFFICE O/P EST LOW 20 MIN: CPT | Mod: PBBFAC | Performed by: OBSTETRICS & GYNECOLOGY

## 2018-08-30 PROCEDURE — 99999 PR PBB SHADOW E&M-EST. PATIENT-LVL III: CPT | Mod: PBBFAC,,, | Performed by: OBSTETRICS & GYNECOLOGY

## 2018-08-30 NOTE — PROGRESS NOTES
"CC: Post-partum follow-up    Maryann Jones is a 23 y.o. female  presents for post-partum visit s/p a .    Delivery Date: 2018  Delivery MD: MD Alex  Gender: male  Birth Weight: 8 pounds 0 ounces  Breast Feeding: NO  Depression: NO  Contraception: IUD    Pregnancy was complicated by: none      /72   Ht 5' 2" (1.575 m)   Wt 53.1 kg (117 lb 1 oz)   LMP 10/26/2017   Breastfeeding? No   BMI 21.41 kg/m²     ROS:  GENERAL: No fever, chills, fatigability or weight loss.  VULVAR: No pain, no lesions and no itching.  VAGINAL: No relaxation, no itching, no discharge, no abnormal bleeding and no lesions.  ABDOMEN: No abdominal pain. Denies nausea. Denies vomiting. No diarrhea. No constipation  BREAST: Denies pain. No lumps. No discharge.  URINARY: No incontinence, no nocturia, no frequency and no dysuria.  CARDIOVASCULAR: No chest pain. No shortness of breath. No leg cramps.  NEUROLOGICAL: No headaches. No vision changes.    PHYSICAL EXAM:  PELVIC: EGBUS within normal limits, normal vagina and vulva        Diagnosis:  1. Routine postpartum follow-up        Plan:     Orders Placed This Encounter    levonorgestrel 17.5 mcg/24 hr (5 years) IUD 17.5 mcg         Patient was counseled today on A.C.S. Pap guidelines and recommendations for yearly pelvic exams and monthly self breast exams; to see her PCP for other health maintenance.    FOLLOW UP: in 1 week for IUD insertion.     "

## 2021-04-26 ENCOUNTER — PATIENT MESSAGE (OUTPATIENT)
Dept: RESEARCH | Facility: HOSPITAL | Age: 26
End: 2021-04-26

## 2021-07-01 ENCOUNTER — PATIENT MESSAGE (OUTPATIENT)
Dept: ADMINISTRATIVE | Facility: OTHER | Age: 26
End: 2021-07-01